# Patient Record
Sex: FEMALE | Race: WHITE | NOT HISPANIC OR LATINO | URBAN - METROPOLITAN AREA
[De-identification: names, ages, dates, MRNs, and addresses within clinical notes are randomized per-mention and may not be internally consistent; named-entity substitution may affect disease eponyms.]

---

## 2017-10-24 ENCOUNTER — EMERGENCY (EMERGENCY)
Facility: HOSPITAL | Age: 42
LOS: 0 days | Discharge: HOME | End: 2017-10-24

## 2017-10-24 DIAGNOSIS — R07.89 OTHER CHEST PAIN: ICD-10-CM

## 2017-10-24 DIAGNOSIS — R42 DIZZINESS AND GIDDINESS: ICD-10-CM

## 2017-10-24 DIAGNOSIS — E03.9 HYPOTHYROIDISM, UNSPECIFIED: ICD-10-CM

## 2017-10-24 DIAGNOSIS — Z79.899 OTHER LONG TERM (CURRENT) DRUG THERAPY: ICD-10-CM

## 2021-08-09 ENCOUNTER — NON-APPOINTMENT (OUTPATIENT)
Age: 46
End: 2021-08-09

## 2021-08-10 ENCOUNTER — APPOINTMENT (OUTPATIENT)
Dept: RHEUMATOLOGY | Facility: CLINIC | Age: 46
End: 2021-08-10
Payer: COMMERCIAL

## 2021-08-10 ENCOUNTER — TRANSCRIPTION ENCOUNTER (OUTPATIENT)
Age: 46
End: 2021-08-10

## 2021-08-10 VITALS
HEART RATE: 63 BPM | BODY MASS INDEX: 22.16 KG/M2 | TEMPERATURE: 98.2 F | HEIGHT: 65 IN | SYSTOLIC BLOOD PRESSURE: 122 MMHG | OXYGEN SATURATION: 98 % | WEIGHT: 133 LBS | DIASTOLIC BLOOD PRESSURE: 82 MMHG

## 2021-08-10 DIAGNOSIS — Z86.39 PERSONAL HISTORY OF OTHER ENDOCRINE, NUTRITIONAL AND METABOLIC DISEASE: ICD-10-CM

## 2021-08-10 DIAGNOSIS — Z87.09 PERSONAL HISTORY OF OTHER DISEASES OF THE RESPIRATORY SYSTEM: ICD-10-CM

## 2021-08-10 DIAGNOSIS — Z86.79 PERSONAL HISTORY OF OTHER DISEASES OF THE CIRCULATORY SYSTEM: ICD-10-CM

## 2021-08-10 DIAGNOSIS — Z78.9 OTHER SPECIFIED HEALTH STATUS: ICD-10-CM

## 2021-08-10 DIAGNOSIS — Z83.3 FAMILY HISTORY OF DIABETES MELLITUS: ICD-10-CM

## 2021-08-10 PROCEDURE — 99205 OFFICE O/P NEW HI 60 MIN: CPT

## 2021-08-10 NOTE — ASSESSMENT
[FreeTextEntry1] : 46 y F here for raynauds. 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 r/b/a discused w pt\par nitro paste for finger base use\par advised core warming  techniques\par \par \par Asthma\par c/w following up w pulmonologist\par I have asked her to bring PFTs results to us\par \par vit D insufficiency\par c/w 1000 u daily\par recheck level\par \par COVID vaccine advised \par \par fu 4 wk\par \par Total time spent was   . Time was divided in review of labs and imaging studies, obtaining history, personally examining patient, counselling patient/ family, ordering medications/tests/ imaging tests, communicating with other health care providers, documentation in electronic health records, independent interpretation of labs, imaging results and procedure results and communicating to the patient/family and care co ordination.\par \par

## 2021-08-10 NOTE — HISTORY OF PRESENT ILLNESS
[FreeTextEntry1] : 46 y here for raynauds. \par \par For many years, she has had discoloration of fingertips and toes that worsened with cold. She has had 3 phase color changes and extreme pain and pins and needles. \par \par SHe feels that her fingers are tight. Her  has weakened. she denied swallowing issues. she has had both shoulder pain that radiates down. \par \par She denied any rashes, canker sores. she has had hair loss and chest pain. chest pain lasts for 2 seconds. she reports that when she wakes yp her hands are swollen.  She denied redness of eyes or painful eye movements.

## 2021-08-11 ENCOUNTER — LABORATORY RESULT (OUTPATIENT)
Age: 46
End: 2021-08-11

## 2021-08-25 ENCOUNTER — APPOINTMENT (OUTPATIENT)
Dept: RHEUMATOLOGY | Facility: CLINIC | Age: 46
End: 2021-08-25
Payer: COMMERCIAL

## 2021-08-25 LAB
25(OH)D3 SERPL-MCNC: 30 NG/ML
ALBUMIN SERPL ELPH-MCNC: 4.7 G/DL
ALDOLASE SERPL-CCNC: 4.1 U/L
ALP BLD-CCNC: 45 U/L
ALT SERPL-CCNC: 14 U/L
ANA PAT FLD IF-IMP: ABNORMAL
ANA PATTERN: ABNORMAL
ANA SER IF-ACNC: ABNORMAL
ANA TITER: ABNORMAL
ANION GAP SERPL CALC-SCNC: 13 MMOL/L
AST SERPL-CCNC: 23 U/L
B2 GLYCOPROT1 IGA SERPL IA-ACNC: <5 SAU
B2 GLYCOPROT1 IGG SER-ACNC: <5 SGU
B2 GLYCOPROT1 IGM SER-ACNC: <5 SMU
BASOPHILS # BLD AUTO: 0.03 K/UL
BASOPHILS NFR BLD AUTO: 0.7 %
BILIRUB SERPL-MCNC: 0.4 MG/DL
BUN SERPL-MCNC: 11 MG/DL
C3 SERPL-MCNC: 89 MG/DL
C4 SERPL-MCNC: 18 MG/DL
CALCIUM SERPL-MCNC: 9.1 MG/DL
CARDIOLIPIN AB SER IA-ACNC: POSITIVE
CCP AB SER IA-ACNC: <8 UNITS
CHLORIDE SERPL-SCNC: 101 MMOL/L
CK SERPL-CCNC: 177 U/L
CO2 SERPL-SCNC: 23 MMOL/L
CREAT SERPL-MCNC: 0.7 MG/DL
CREAT SPEC-SCNC: 84 MG/DL
CREAT/PROT UR: 0.1 RATIO
CRP SERPL-MCNC: <3 MG/L
ENA RNP AB SER IA-ACNC: 0.3 AL
ENA SCL70 IGG SER IA-ACNC: <0.2 AL
ENA SM AB SER IA-ACNC: <0.2 AL
ENA SS-A AB SER IA-ACNC: <0.2 AL
ENA SS-B AB SER IA-ACNC: <0.2 AL
EOSINOPHIL # BLD AUTO: 0.08 K/UL
EOSINOPHIL NFR BLD AUTO: 1.8 %
ERYTHROCYTE [SEDIMENTATION RATE] IN BLOOD BY WESTERGREN METHOD: 6 MM/HR
GLUCOSE SERPL-MCNC: 74 MG/DL
HCT VFR BLD CALC: 34 %
HGB BLD-MCNC: 11.4 G/DL
IMM GRANULOCYTES NFR BLD AUTO: 0.5 %
LYMPHOCYTES # BLD AUTO: 1.61 K/UL
LYMPHOCYTES NFR BLD AUTO: 36.5 %
MAN DIFF?: NORMAL
MCHC RBC-ENTMCNC: 33.2 PG
MCHC RBC-ENTMCNC: 33.5 G/DL
MCV RBC AUTO: 99.1 FL
MONOCYTES # BLD AUTO: 0.31 K/UL
MONOCYTES NFR BLD AUTO: 7 %
NEUTROPHILS # BLD AUTO: 2.36 K/UL
NEUTROPHILS NFR BLD AUTO: 53.5 %
PLATELET # BLD AUTO: 273 K/UL
POTASSIUM SERPL-SCNC: 3.6 MMOL/L
PROT SERPL-MCNC: 7.7 G/DL
PROT UR-MCNC: 6 MG/DLG/24H
RBC # BLD: 3.43 M/UL
RBC # FLD: 13.1 %
RF+CCP IGG SER-IMP: NEGATIVE
RHEUMATOID FACT SER QL: <10 IU/ML
SODIUM SERPL-SCNC: 137 MMOL/L
STREP DNASE B TITR SER: 79 U/ML
WBC # FLD AUTO: 4.41 K/UL

## 2021-08-25 PROCEDURE — 99215 OFFICE O/P EST HI 40 MIN: CPT | Mod: 95

## 2021-08-25 NOTE — ASSESSMENT
[FreeTextEntry1] : 46 y F here for raynauds. 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 \par nitro paste for finger base use\par advised core warming  techniques\par \par  Scleroderma, myositis, RA workup, neg\par Concern for SLE\par leukopenia, anemia, ARSENIO positive 1:80 speckled , aCL IgM high at 30.5. oral ulcers and pleurisy\par advised she needs repeat aPL testing next month. Literature does not support aspirin use in this case\par haptoglobin, LDH, Marti, retic count to workup anemia\par PFT pending for pleurisy workup\par Plaquenil 400 mg daily advised. r/b/a discussed. she will consider it next visit\par \par \par \par Asthma\par c/w following up w pulmonologist\par I have asked her to bring PFTs results to us\par \par vit D insufficiency\par c/w 1000 u daily\par Level was normal \par \par HM\par COVID vaccine advised \par \par fu 4 wk\par \par Total time spent was   . Time was divided in review of labs and imaging studies, obtaining history, personally examining patient, counselling patient/ family, ordering medications/tests/ imaging tests, communicating with other health care providers, documentation in electronic health records, independent interpretation of labs, imaging results and procedure results and communicating to the patient/family and care co ordination.\par \par

## 2021-08-31 ENCOUNTER — NON-APPOINTMENT (OUTPATIENT)
Age: 46
End: 2021-08-31

## 2021-08-31 DIAGNOSIS — M79.89 OTHER SPECIFIED SOFT TISSUE DISORDERS: ICD-10-CM

## 2021-09-16 ENCOUNTER — APPOINTMENT (OUTPATIENT)
Dept: RHEUMATOLOGY | Facility: CLINIC | Age: 46
End: 2021-09-16
Payer: COMMERCIAL

## 2021-09-16 LAB
DIRECT COOMBS: NORMAL
EJ AB SER QL: NEGATIVE
ENA JO1 AB SER IA-ACNC: <20 UNITS
ENA PM/SCL AB SER-ACNC: <20 UNITS
ENA SM+RNP AB SER IA-ACNC: <20 UNITS
ENA SS-A IGG SER QL: <20 UNITS
FIBRILLARIN AB SER QL: NEGATIVE
HAPTOGLOB SERPL-MCNC: 101 MG/DL
KU AB SER QL: NEGATIVE
LDH SERPL-CCNC: 151
MDA-5 (P140)(CADM-140): <20 UNITS
MI2 AB SER QL: NEGATIVE
NXP-2 (P140): <20 UNITS
OJ AB SER QL: NEGATIVE
PL12 AB SER QL: NEGATIVE
PL7 AB SER QL: NEGATIVE
RBC # BLD: 3.64 M/UL
RETICS # AUTO: 1.2 %
RETICS AGGREG/RBC NFR: 41.9 K/UL
SRP AB SERPL QL: NEGATIVE
TIF GAMMA (P155/140): <20 UNITS
U2 SNRNP AB SER QL: NEGATIVE

## 2021-09-16 PROCEDURE — 99215 OFFICE O/P EST HI 40 MIN: CPT | Mod: 95

## 2021-09-16 NOTE — HISTORY OF PRESENT ILLNESS
[FreeTextEntry1] : 46 y here for raynauds. \par \par Interval history\par She still has fingertips and toes discoloration red to blue to white. It is associated with pain. \par She still has finger swelling every day, worse in AM. Her feet are swollen as well. \par No rashes. \par SHe has had chest pain with breathing for the past 2 months. \par Today she reports that she has had oral sores on inside of lips infrequently. \par She has had hair loss from front. \par She has had no miscarriage or blood clot history. reported 3 pregnancies were normal. \par \par Rheum history \par For many years, she has had discoloration of fingertips and toes that worsened with cold. She has had 3 phase color changes and extreme pain and pins and needles. \par \par SHe feels that her fingers are tight. Her  has weakened. she denied swallowing issues. she has had both shoulder pain that radiates down. \par \par She denied any rashes, canker sores. she has had hair loss and chest pain. chest pain lasts for 2 seconds. she reports that when she wakes yp her hands are swollen.  She denied redness of eyes or painful eye movements.

## 2021-09-16 NOTE — ASSESSMENT
[FreeTextEntry1] : 46 y F here for raynauds. 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 \par nitro paste for finger base use\par advised core warming  techniques\par \par  Scleroderma, myositis, RA workup, neg\par Concern for SLE\par leukopenia, anemia, ARSENIO positive 1:80 speckled , aCL IgM high at 30.5. oral ulcers and pleurisy\par advised she needs repeat aPL testing in 11/21. Literature does not support aspirin use in this case\par haptoglobin, LDH, Marti, retic count all negative so no hemolytic anemia \par PFT pending for pleurisy workup\par Plaquenil 400 mg daily advised. r/b/a discussed.  ophthalmology yearly visit advised\par \par \par \par Asthma\par c/w following up w pulmonologist\par I have asked her to bring PFTs results to us\par \par vit D insufficiency\par c/w 1000 u daily\par Level was normal \par \par HM\par COVID vaccine advised \par \par fu 4 wk\par \par Total time spent was   . Time was divided in review of labs and imaging studies, obtaining history, personally examining patient, counselling patient/ family, ordering medications/tests/ imaging tests, communicating with other health care providers, documentation in electronic health records, independent interpretation of labs, imaging results and procedure results and communicating to the patient/family and care co ordination.\par \par

## 2021-09-16 NOTE — PHYSICAL EXAM
[General Appearance - Alert] : alert [General Appearance - In No Acute Distress] : in no acute distress [Sclera] : the sclera and conjunctiva were normal [PERRL With Normal Accommodation] : pupils were equal in size, round, and reactive to light [Extraocular Movements] : extraocular movements were intact [Outer Ear] : the ears and nose were normal in appearance [Oropharynx] : the oropharynx was normal [Neck Appearance] : the appearance of the neck was normal [Neck Cervical Mass (___cm)] : no neck mass was observed [Jugular Venous Distention Increased] : there was no jugular-venous distention [Thyroid Diffuse Enlargement] : the thyroid was not enlarged [Thyroid Nodule] : there were no palpable thyroid nodules [Auscultation Breath Sounds / Voice Sounds] : lungs were clear to auscultation bilaterally [Heart Rate And Rhythm] : heart rate was normal and rhythm regular [Heart Sounds] : normal S1 and S2 [Heart Sounds Gallop] : no gallops [Murmurs] : no murmurs [Heart Sounds Pericardial Friction Rub] : no pericardial rub [Bowel Sounds] : normal bowel sounds [Abdomen Soft] : soft [Abdomen Tenderness] : non-tender [Abdomen Mass (___ Cm)] : no abdominal mass palpated [Abnormal Walk] : normal gait [Musculoskeletal - Swelling] : no joint swelling seen [Nail Clubbing] : no clubbing  or cyanosis of the fingernails [Motor Tone] : muscle strength and tone were normal [Skin Color & Pigmentation] : normal skin color and pigmentation [Skin Turgor] : normal skin turgor [] : no rash [Deep Tendon Reflexes (DTR)] : deep tendon reflexes were 2+ and symmetric [Sensation] : the sensory exam was normal to light touch and pinprick [No Focal Deficits] : no focal deficits [Oriented To Time, Place, And Person] : oriented to person, place, and time [Impaired Insight] : insight and judgment were intact [Affect] : the affect was normal

## 2021-09-29 ENCOUNTER — OUTPATIENT (OUTPATIENT)
Dept: OUTPATIENT SERVICES | Facility: HOSPITAL | Age: 46
LOS: 1 days | Discharge: HOME | End: 2021-09-29
Payer: COMMERCIAL

## 2021-09-29 DIAGNOSIS — M79.89 OTHER SPECIFIED SOFT TISSUE DISORDERS: ICD-10-CM

## 2021-09-29 DIAGNOSIS — I73.00 RAYNAUD'S SYNDROME WITHOUT GANGRENE: ICD-10-CM

## 2021-09-29 PROCEDURE — 93970 EXTREMITY STUDY: CPT | Mod: 26

## 2021-10-07 ENCOUNTER — APPOINTMENT (OUTPATIENT)
Dept: RHEUMATOLOGY | Facility: CLINIC | Age: 46
End: 2021-10-07
Payer: COMMERCIAL

## 2021-10-07 VITALS
OXYGEN SATURATION: 98 % | BODY MASS INDEX: 21.49 KG/M2 | SYSTOLIC BLOOD PRESSURE: 108 MMHG | DIASTOLIC BLOOD PRESSURE: 72 MMHG | HEART RATE: 60 BPM | WEIGHT: 129 LBS | TEMPERATURE: 98.1 F | HEIGHT: 65 IN

## 2021-10-07 PROCEDURE — 99215 OFFICE O/P EST HI 40 MIN: CPT

## 2021-10-07 NOTE — PHYSICAL EXAM
[General Appearance - In No Acute Distress] : in no acute distress [General Appearance - Alert] : alert [Sclera] : the sclera and conjunctiva were normal [PERRL With Normal Accommodation] : pupils were equal in size, round, and reactive to light [Extraocular Movements] : extraocular movements were intact [Outer Ear] : the ears and nose were normal in appearance [Oropharynx] : the oropharynx was normal [Neck Appearance] : the appearance of the neck was normal [Neck Cervical Mass (___cm)] : no neck mass was observed [Jugular Venous Distention Increased] : there was no jugular-venous distention [Thyroid Diffuse Enlargement] : the thyroid was not enlarged [Thyroid Nodule] : there were no palpable thyroid nodules [Auscultation Breath Sounds / Voice Sounds] : lungs were clear to auscultation bilaterally [Heart Rate And Rhythm] : heart rate was normal and rhythm regular [Heart Sounds] : normal S1 and S2 [Heart Sounds Gallop] : no gallops [Murmurs] : no murmurs [Heart Sounds Pericardial Friction Rub] : no pericardial rub [Bowel Sounds] : normal bowel sounds [Abdomen Soft] : soft [Abdomen Tenderness] : non-tender [Abdomen Mass (___ Cm)] : no abdominal mass palpated [Abnormal Walk] : normal gait [Nail Clubbing] : no clubbing  or cyanosis of the fingernails [Musculoskeletal - Swelling] : no joint swelling seen [Motor Tone] : muscle strength and tone were normal [Skin Color & Pigmentation] : normal skin color and pigmentation [Skin Turgor] : normal skin turgor [] : no rash [Deep Tendon Reflexes (DTR)] : deep tendon reflexes were 2+ and symmetric [Sensation] : the sensory exam was normal to light touch and pinprick [No Focal Deficits] : no focal deficits [Oriented To Time, Place, And Person] : oriented to person, place, and time [Impaired Insight] : insight and judgment were intact [Affect] : the affect was normal

## 2021-10-07 NOTE — ASSESSMENT
[FreeTextEntry1] : 46 y F here for raynauds. 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 r/b/a discussed. still with frequent episodes of raynaud. \par start sildenafil 20 mg BID (space out nifeipine and sildenafil). r/b/a discussed\par nitro paste for finger base use discontinued as contraindicated with sildenafil 2/2 hypotension\par advised core warming  techniques\par US leg negative for DVT\par \par  Scleroderma, myositis, RA workup, neg\par Concern for SLE\par leukopenia, anemia, ARSENIO positive 1:80 speckled , aCL IgM high at 30.5. oral ulcers and pleurisy, temporal alopecia\par advised she needs repeat aPL testing in 11/21. Literature does not support aspirin use in this case\par haptoglobin, LDH, Marti, retic count all negative so no hemolytic anemia \par PFT pending for pleurisy workup\par Plaquenil 400 mg daily advised. r/b/a discussed.  ophthalmology yearly visit advised\par \par \par \par Asthma\par c/w following up w pulmonologist\par I have asked her to bring PFTs results to us\par \par vit D insufficiency\par c/w 1000 u daily\par Level was normal \par \par HM\par COVID vaccine advised \par \par fu 4 wk\par \par Total time spent was   . Time was divided in review of labs and imaging studies, obtaining history, personally examining patient, counselling patient/ family, ordering medications/tests/ imaging tests, communicating with other health care providers, documentation in electronic health records, independent interpretation of labs, imaging results and procedure results and communicating to the patient/family and care co ordination.\par \par

## 2021-12-08 ENCOUNTER — APPOINTMENT (OUTPATIENT)
Dept: RHEUMATOLOGY | Facility: CLINIC | Age: 46
End: 2021-12-08
Payer: COMMERCIAL

## 2021-12-08 PROCEDURE — 99215 OFFICE O/P EST HI 40 MIN: CPT

## 2021-12-08 NOTE — ASSESSMENT
[FreeTextEntry1] : 46 y F here for raynauds. 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 r/b/a discussed. still with frequent episodes of raynaud. \par start sildenafil 20 mg BID (space out nifeipine and sildenafil). r/b/a discussed. THis medication was denied  by insurance\par nitro paste for finger base use discontinued as contraindicated with sildenafil 2/2 hypotension\par advised core warming  techniques\par US leg negative for DVT\par \par  Scleroderma, myositis, RA workup, neg\par Concern for SLE\par leukopenia, anemia, ARSENIO positive 1:80 speckled , aCL IgM high at 30.5. oral ulcers and pleurisy, temporal alopecia\par advised she needs repeat aPL testing in 12/21. ordsered labs. Literature does not support aspirin use in this case\par haptoglobin, LDH, Marti, retic count all negative so no hemolytic anemia \par PFT pending for pleurisy workup\par Plaquenil 400 mg daily advised. r/b/a discussed.  ophthalmology yearly visit advised\par If she has continued synovitis on Plaquenil, will consider adding MTX\par \par \par Asthma\par c/w following up w pulmonologist\par I have asked her to bring PFTs results to us\par \par vit D insufficiency\par c/w 1000 u daily\par Level was normal \par \par HM\par COVID vaccine advised \par \par fu 4 wk\par \par Total time spent was   . Time was divided in review of labs and imaging studies, obtaining history, personally examining patient, counselling patient/ family, ordering medications/tests/ imaging tests, communicating with other health care providers, documentation in electronic health records, independent interpretation of labs, imaging results and procedure results and communicating to the patient/family and care co ordination.\par \par

## 2021-12-10 ENCOUNTER — LABORATORY RESULT (OUTPATIENT)
Age: 46
End: 2021-12-10

## 2021-12-16 LAB
B2 GLYCOPROT1 IGA SERPL IA-ACNC: <5 SAU
B2 GLYCOPROT1 IGG SER-ACNC: <5 SGU
B2 GLYCOPROT1 IGM SER-ACNC: <5 SMU
CARDIOLIPIN AB SER IA-ACNC: POSITIVE

## 2022-01-03 ENCOUNTER — APPOINTMENT (OUTPATIENT)
Dept: RHEUMATOLOGY | Facility: CLINIC | Age: 47
End: 2022-01-03

## 2022-01-24 ENCOUNTER — APPOINTMENT (OUTPATIENT)
Dept: RHEUMATOLOGY | Facility: CLINIC | Age: 47
End: 2022-01-24

## 2022-03-21 ENCOUNTER — APPOINTMENT (OUTPATIENT)
Dept: RHEUMATOLOGY | Facility: CLINIC | Age: 47
End: 2022-03-21
Payer: COMMERCIAL

## 2022-03-21 VITALS
HEIGHT: 65 IN | BODY MASS INDEX: 21.66 KG/M2 | TEMPERATURE: 97.9 F | HEART RATE: 95 BPM | DIASTOLIC BLOOD PRESSURE: 80 MMHG | OXYGEN SATURATION: 97 % | WEIGHT: 130 LBS | SYSTOLIC BLOOD PRESSURE: 120 MMHG

## 2022-03-21 DIAGNOSIS — R20.0 ANESTHESIA OF SKIN: ICD-10-CM

## 2022-03-21 PROCEDURE — 99215 OFFICE O/P EST HI 40 MIN: CPT

## 2022-03-21 RX ORDER — TADALAFIL 20 MG/1
20 TABLET ORAL DAILY
Qty: 30 | Refills: 3 | Status: DISCONTINUED | COMMUNITY
Start: 2021-12-13 | End: 2022-03-21

## 2022-03-21 NOTE — PHYSICAL EXAM
[General Appearance - Alert] : alert [General Appearance - In No Acute Distress] : in no acute distress [PERRL With Normal Accommodation] : pupils were equal in size, round, and reactive to light [Sclera] : the sclera and conjunctiva were normal [Extraocular Movements] : extraocular movements were intact [Outer Ear] : the ears and nose were normal in appearance [Oropharynx] : the oropharynx was normal [Neck Appearance] : the appearance of the neck was normal [Neck Cervical Mass (___cm)] : no neck mass was observed [Jugular Venous Distention Increased] : there was no jugular-venous distention [Thyroid Diffuse Enlargement] : the thyroid was not enlarged [Thyroid Nodule] : there were no palpable thyroid nodules [Auscultation Breath Sounds / Voice Sounds] : lungs were clear to auscultation bilaterally [Heart Rate And Rhythm] : heart rate was normal and rhythm regular [Heart Sounds] : normal S1 and S2 [Heart Sounds Gallop] : no gallops [Murmurs] : no murmurs [Heart Sounds Pericardial Friction Rub] : no pericardial rub [Bowel Sounds] : normal bowel sounds [Abdomen Soft] : soft [Abdomen Tenderness] : non-tender [Abdomen Mass (___ Cm)] : no abdominal mass palpated [Abnormal Walk] : normal gait [Nail Clubbing] : no clubbing  or cyanosis of the fingernails [Musculoskeletal - Swelling] : no joint swelling seen [Motor Tone] : muscle strength and tone were normal [Skin Color & Pigmentation] : normal skin color and pigmentation [Skin Turgor] : normal skin turgor [] : no rash [Deep Tendon Reflexes (DTR)] : deep tendon reflexes were 2+ and symmetric [Sensation] : the sensory exam was normal to light touch and pinprick [No Focal Deficits] : no focal deficits [Oriented To Time, Place, And Person] : oriented to person, place, and time [Impaired Insight] : insight and judgment were intact [Affect] : the affect was normal

## 2022-03-22 NOTE — HISTORY OF PRESENT ILLNESS
[FreeTextEntry1] : 46 y here for raynauds and SLE \par \par \par 3/21/22:\par She has hair loss, memory loss for the past 2 months. Distant memory is intact. She has right wrist pain and right forearm bone pain. Fingers and feet feel swollen. Raynaud's is frequent she has gloves and hand warmers associated with numbness with pins and needles. Feels sweats, fatigue, she gets out of breath going up stairs, difficulty taking a deep breath (since August 2021), and palpitations. She gets red rash over nose, photosensitivity, mouth sores, numbness in lips, pruritus, headache. She feels depressed.\par \par 12/8/22:\par She still has fingertips and toes discoloration red to blue to white. It is associated with pain. \par She still has finger swelling every day, worse in AM. Her feet are swollen as well. \par No rashes. \par SHe has had chest pain with breathing for the past 2 months. \par Today she reports that she has had oral sores on inside of lips infrequently. \par She has had hair loss from front. \par She has had no miscarriage or blood clot history. reported 3 pregnancies were normal. \par \par Rheum history \par For many years, she has had discoloration of fingertips and toes that worsened with cold. She has had 3 phase color changes and extreme pain and pins and needles. \par \par SHe feels that her fingers are tight. Her  has weakened. she denied swallowing issues. she has had both shoulder pain that radiates down. \par \par She denied any rashes, canker sores. she has had hair loss and chest pain. chest pain lasts for 2 seconds. she reports that when she wakes yp her hands are swollen.  She denied redness of eyes or painful eye movements.

## 2022-03-22 NOTE — ASSESSMENT
[FreeTextEntry1] : 46 y F here for raynauds. 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 r/b/a discussed. still with frequent episodes of raynaud. \par Continue sildenafil 20 mg BID (space out nifeipine and sildenafil). r/b/a discussed. THis medication was denied  by insurance\par nitro paste for finger base use discontinued as contraindicated with sildenafil 2/2 hypotension\par advised core warming  techniques\par US leg negative for DVT\par \par  Scleroderma, myositis, RA workup, neg\par Concern for SLE\par leukopenia, anemia, ARSENIO positive 1:80 speckled , aCL IgM high at 30.5. oral ulcers and pleurisy, temporal alopecia\par advised she needs repeat aPL testing in 12/21. ordered labs. Literature does not support aspirin use in this case\par haptoglobin, LDH, Marti, retic count all negative so no hemolytic anemia \par PFT pending for pleurisy workup\par Plaquenil 400 mg daily advised. r/b/a discussed.  ophthalmology yearly visit advised\par She has continued joint pains start MTX 6 tabs weekly and folic acid 1 mg daily r/b/a discussed needs monitoring CBC, CMP, ESR, CRP in 1 month\par Check SLE monitoring labs\par \par \par Asthma\par c/w following up w pulmonologist \par I have asked her to bring PFTs results to us\par \par Palpitations with dyspnea\par -Refer back to cardiology\par \par Forgetfulness and paresthesias\par -memory difficulties ?fibromyalgia picture \par -paresthesias ? raynauds\par -See neurology for eval\par \par \par vit D insufficiency\par c/w 1000 u daily\par Level was normal \par \par HM\par COVID vaccine advised \par \par fu 4 wk\par \par Total time spent was   . Time was divided in review of labs and imaging studies, obtaining history, personally examining patient, counselling patient/ family, ordering medications/tests/ imaging tests, communicating with other health care providers, documentation in electronic health records, independent interpretation of labs, imaging results and procedure results and communicating to the patient/family and care co ordination.\par \par

## 2022-03-26 ENCOUNTER — LABORATORY RESULT (OUTPATIENT)
Age: 47
End: 2022-03-26

## 2022-03-28 LAB
ALBUMIN SERPL ELPH-MCNC: 4.5 G/DL
ALP BLD-CCNC: 37 U/L
ALT SERPL-CCNC: 11 U/L
ANION GAP SERPL CALC-SCNC: 12 MMOL/L
APPEARANCE: ABNORMAL
AST SERPL-CCNC: 17 U/L
BASOPHILS # BLD AUTO: 0.03 K/UL
BASOPHILS NFR BLD AUTO: 0.9 %
BILIRUB SERPL-MCNC: 0.7 MG/DL
BILIRUBIN URINE: NEGATIVE
BLOOD URINE: ABNORMAL
BUN SERPL-MCNC: 14 MG/DL
C3 SERPL-MCNC: 168 MG/DL
C4 SERPL-MCNC: 17 MG/DL
CALCIUM SERPL-MCNC: 9.3 MG/DL
CHLORIDE SERPL-SCNC: 103 MMOL/L
CO2 SERPL-SCNC: 24 MMOL/L
COLOR: YELLOW
CREAT SERPL-MCNC: 0.7 MG/DL
CREAT SPEC-SCNC: 170 MG/DL
CREAT/PROT UR: 0.1 RATIO
CRP SERPL-MCNC: <3 MG/L
EGFR: 108 ML/MIN/1.73M2
EOSINOPHIL # BLD AUTO: 0.07 K/UL
EOSINOPHIL NFR BLD AUTO: 2 %
ERYTHROCYTE [SEDIMENTATION RATE] IN BLOOD BY WESTERGREN METHOD: 7 MM/HR
GLUCOSE QUALITATIVE U: NEGATIVE
GLUCOSE SERPL-MCNC: 85 MG/DL
HCT VFR BLD CALC: 37.5 %
HGB BLD-MCNC: 12.2 G/DL
IMM GRANULOCYTES NFR BLD AUTO: 0.3 %
KETONES URINE: NEGATIVE
LEUKOCYTE ESTERASE URINE: ABNORMAL
LYMPHOCYTES # BLD AUTO: 1.4 K/UL
LYMPHOCYTES NFR BLD AUTO: 40.3 %
MAN DIFF?: NORMAL
MCHC RBC-ENTMCNC: 32.5 G/DL
MCHC RBC-ENTMCNC: 33 PG
MCV RBC AUTO: 101.4 FL
MONOCYTES # BLD AUTO: 0.22 K/UL
MONOCYTES NFR BLD AUTO: 6.3 %
NEUTROPHILS # BLD AUTO: 1.74 K/UL
NEUTROPHILS NFR BLD AUTO: 50.2 %
NITRITE URINE: NEGATIVE
PH URINE: 5.5
PLATELET # BLD AUTO: 293 K/UL
POTASSIUM SERPL-SCNC: 4.6 MMOL/L
PROT SERPL-MCNC: 7.2 G/DL
PROT UR-MCNC: 10 MG/DLG/24H
PROTEIN URINE: NORMAL
RBC # BLD: 3.7 M/UL
RBC # FLD: 13.4 %
SODIUM SERPL-SCNC: 139 MMOL/L
SPECIFIC GRAVITY URINE: 1.02
UROBILINOGEN URINE: NORMAL
WBC # FLD AUTO: 3.47 K/UL

## 2022-03-30 LAB — DSDNA AB SER-ACNC: 13 IU/ML

## 2022-04-07 ENCOUNTER — APPOINTMENT (OUTPATIENT)
Dept: CARDIOLOGY | Facility: CLINIC | Age: 47
End: 2022-04-07
Payer: COMMERCIAL

## 2022-04-07 VITALS — SYSTOLIC BLOOD PRESSURE: 130 MMHG | DIASTOLIC BLOOD PRESSURE: 80 MMHG | RESPIRATION RATE: 18 BRPM | HEART RATE: 76 BPM

## 2022-04-07 VITALS — HEIGHT: 65 IN | BODY MASS INDEX: 21.66 KG/M2 | WEIGHT: 130 LBS

## 2022-04-07 DIAGNOSIS — E78.2 MIXED HYPERLIPIDEMIA: ICD-10-CM

## 2022-04-07 DIAGNOSIS — Z82.41 FAMILY HISTORY OF SUDDEN CARDIAC DEATH: ICD-10-CM

## 2022-04-07 DIAGNOSIS — Z78.9 OTHER SPECIFIED HEALTH STATUS: ICD-10-CM

## 2022-04-07 DIAGNOSIS — E03.9 HYPOTHYROIDISM, UNSPECIFIED: ICD-10-CM

## 2022-04-07 PROCEDURE — 93000 ELECTROCARDIOGRAM COMPLETE: CPT

## 2022-04-07 PROCEDURE — 99204 OFFICE O/P NEW MOD 45 MIN: CPT

## 2022-04-07 RX ORDER — LEVOTHYROXINE SODIUM 0.15 MG/1
150 TABLET ORAL
Refills: 0 | Status: ACTIVE | COMMUNITY

## 2022-04-07 NOTE — PHYSICAL EXAM
[Well Developed] : well developed [Well Nourished] : well nourished [No Acute Distress] : no acute distress [Normal Conjunctiva] : normal conjunctiva [Normal Venous Pressure] : normal venous pressure [No Carotid Bruit] : no carotid bruit [Normal S1, S2] : normal S1, S2 [No Rub] : no rub [No Gallop] : no gallop [Clear Lung Fields] : clear lung fields [Good Air Entry] : good air entry [No Respiratory Distress] : no respiratory distress  [Soft] : abdomen soft [Non Tender] : non-tender [No Masses/organomegaly] : no masses/organomegaly [Normal Bowel Sounds] : normal bowel sounds [Normal Gait] : normal gait [No Edema] : no edema [No Cyanosis] : no cyanosis [No Clubbing] : no clubbing [No Varicosities] : no varicosities [No Rash] : no rash [No Skin Lesions] : no skin lesions [Moves all extremities] : moves all extremities [No Focal Deficits] : no focal deficits [Normal Speech] : normal speech [Alert and Oriented] : alert and oriented [Normal memory] : normal memory [de-identified] : II/VI systolic

## 2022-04-18 ENCOUNTER — APPOINTMENT (OUTPATIENT)
Dept: RHEUMATOLOGY | Facility: CLINIC | Age: 47
End: 2022-04-18
Payer: COMMERCIAL

## 2022-04-18 DIAGNOSIS — Z79.899 OTHER LONG TERM (CURRENT) DRUG THERAPY: ICD-10-CM

## 2022-04-18 PROCEDURE — 99214 OFFICE O/P EST MOD 30 MIN: CPT

## 2022-04-18 NOTE — ASSESSMENT
[FreeTextEntry1] : 46 y F here for raynauds. \par \par Raynauds and SLE\par Likely  secondary to autoimmune disease\par It is getting worse now\par has capillary dropout on nailfold\par no skin thickening, sclerodactyly, telangiectasias \par no esophageal symptoms \par Increase Nifedipine to 30 QID. r/b/a discussed. still with frequent episodes of raynaud. \par Continue sildenafil 20 mg BID (space out nifeipine and sildenafil). r/b/a discussed. If symptoms persist with escalation of Nifedipine increase sildenafil to TID\par nitro paste for finger base use discontinued as contraindicated with sildenafil 2/2 hypotension\par advised core warming  techniques\par US leg negative for DVT\par \par \par SLE\par Scleroderma, myositis, RA workup, neg\par leukopenia, anemia, ARSENIO positive 1:80 speckled , aCL IgM high at 30.5. oral ulcers and pleurisy, temporal alopecia\par advised she needs repeat aPL testing in 12/21. ordered labs. Literature does not support aspirin use in this case\par haptoglobin, LDH, Marti, retic count all negative so no hemolytic anemia \par PFT pending for pleurisy workup\par Plaquenil 400 mg daily advised. r/b/a discussed.  ophthalmology yearly visit advised\par She has continued joint pains start MTX 6 tabs weekly and folic acid 1 mg daily r/b/a discussed needs monitoring Check MTX monitoring labs CBC, CMP, ESR, CRP\par \par \par \par Asthma\par c/w following up w pulmonologist \par I have asked her to bring PFTs results to us\par \par Palpitations with dyspnea\par -Following with cardiology TTE and stress test pending\par \par Forgetfulness and paresthesias\par -memory difficulties ?fibromyalgia picture \par -paresthesias ? raynauds\par -Following with neurology \par \par \par vit D insufficiency\par c/w 1000 u daily\par Level was normal \par \par HM\par COVID vaccine advised \par \par fu 6 wk\par \par

## 2022-04-18 NOTE — HISTORY OF PRESENT ILLNESS
[FreeTextEntry1] : 46 year old female here for raynauds and SLE \par \par 4/18/22:\par She reports left shoulder pain for the past 2 weeks without trauma associated with weakness. It hurts when she lifts her arm and has weakness on her left upper arm. Raynauds is active in the warm weather. Still with ongoing hair loss. She has finger and wrist pains worse in the morning, AM stiffness, and swollen. Has appointment with pulmonary 5/17 Dr. Sanches. Reports dyspnea on exertion and burning sensation in her chest radiating to her back\par \par \par 3/21/22:\par She has hair loss, memory loss for the past 2 months. Distant memory is intact. She has right wrist pain and right forearm bone pain. Fingers and feet feel swollen. Raynaud's is frequent she has gloves and hand warmers associated with numbness with pins and needles. Feels sweats, fatigue, she gets out of breath going up stairs, difficulty taking a deep breath (since August 2021), and palpitations. She gets red rash over nose, photosensitivity, mouth sores, numbness in lips, pruritus, headache. She feels depressed.\par \par 12/8/22:\par She still has fingertips and toes discoloration red to blue to white. It is associated with pain. \par She still has finger swelling every day, worse in AM. Her feet are swollen as well. \par No rashes. \par SHe has had chest pain with breathing for the past 2 months. \par Today she reports that she has had oral sores on inside of lips infrequently. \par She has had hair loss from front. \par She has had no miscarriage or blood clot history. reported 3 pregnancies were normal. \par \par Rheum history \par For many years, she has had discoloration of fingertips and toes that worsened with cold. She has had 3 phase color changes and extreme pain and pins and needles. \par \par SHe feels that her fingers are tight. Her  has weakened. she denied swallowing issues. she has had both shoulder pain that radiates down. \par \par She denied any rashes, canker sores. she has had hair loss and chest pain. chest pain lasts for 2 seconds. she reports that when she wakes yp her hands are swollen.  She denied redness of eyes or painful eye movements.

## 2022-04-18 NOTE — PHYSICAL EXAM
[General Appearance - Alert] : alert [General Appearance - In No Acute Distress] : in no acute distress [Sclera] : the sclera and conjunctiva were normal [PERRL With Normal Accommodation] : pupils were equal in size, round, and reactive to light [Extraocular Movements] : extraocular movements were intact [Outer Ear] : the ears and nose were normal in appearance [Oropharynx] : the oropharynx was normal [Neck Appearance] : the appearance of the neck was normal [Neck Cervical Mass (___cm)] : no neck mass was observed [Jugular Venous Distention Increased] : there was no jugular-venous distention [Thyroid Diffuse Enlargement] : the thyroid was not enlarged [Thyroid Nodule] : there were no palpable thyroid nodules [Heart Rate And Rhythm] : heart rate was normal and rhythm regular [Auscultation Breath Sounds / Voice Sounds] : lungs were clear to auscultation bilaterally [Heart Sounds] : normal S1 and S2 [Heart Sounds Gallop] : no gallops [Murmurs] : no murmurs [Heart Sounds Pericardial Friction Rub] : no pericardial rub [Bowel Sounds] : normal bowel sounds [Abdomen Soft] : soft [Abdomen Tenderness] : non-tender [Abdomen Mass (___ Cm)] : no abdominal mass palpated [Abnormal Walk] : normal gait [Nail Clubbing] : no clubbing  or cyanosis of the fingernails [Musculoskeletal - Swelling] : no joint swelling seen [Motor Tone] : muscle strength and tone were normal [Skin Color & Pigmentation] : normal skin color and pigmentation [Skin Turgor] : normal skin turgor [] : no rash [Deep Tendon Reflexes (DTR)] : deep tendon reflexes were 2+ and symmetric [Sensation] : the sensory exam was normal to light touch and pinprick [No Focal Deficits] : no focal deficits [Oriented To Time, Place, And Person] : oriented to person, place, and time [Impaired Insight] : insight and judgment were intact [Affect] : the affect was normal [FreeTextEntry1] : Limited abduction left shoulder. [place] and empty can test positive

## 2022-05-03 LAB
ALBUMIN SERPL ELPH-MCNC: 4.5 G/DL
ALP BLD-CCNC: 40 U/L
ALT SERPL-CCNC: 10 U/L
ANION GAP SERPL CALC-SCNC: 8 MMOL/L
APPEARANCE: CLEAR
AST SERPL-CCNC: 18 U/L
BASOPHILS # BLD AUTO: 0.03 K/UL
BASOPHILS NFR BLD AUTO: 0.8 %
BILIRUB SERPL-MCNC: 0.4 MG/DL
BILIRUBIN URINE: NEGATIVE
BLOOD URINE: NEGATIVE
BUN SERPL-MCNC: 20 MG/DL
CALCIUM SERPL-MCNC: 9.4 MG/DL
CHLORIDE SERPL-SCNC: 105 MMOL/L
CO2 SERPL-SCNC: 27 MMOL/L
COLOR: NORMAL
CREAT SERPL-MCNC: 0.5 MG/DL
CRP SERPL-MCNC: <3 MG/L
EGFR: 117 ML/MIN/1.73M2
EOSINOPHIL # BLD AUTO: 0.07 K/UL
EOSINOPHIL NFR BLD AUTO: 1.8 %
ERYTHROCYTE [SEDIMENTATION RATE] IN BLOOD BY WESTERGREN METHOD: 15 MM/HR
GLUCOSE QUALITATIVE U: NEGATIVE
GLUCOSE SERPL-MCNC: 79 MG/DL
HCT VFR BLD CALC: 37.5 %
HGB BLD-MCNC: 12.4 G/DL
IMM GRANULOCYTES NFR BLD AUTO: 0.3 %
KETONES URINE: NEGATIVE
LEUKOCYTE ESTERASE URINE: NEGATIVE
LYMPHOCYTES # BLD AUTO: 1.34 K/UL
LYMPHOCYTES NFR BLD AUTO: 33.8 %
MAN DIFF?: NORMAL
MCHC RBC-ENTMCNC: 33.1 G/DL
MCHC RBC-ENTMCNC: 33.5 PG
MCV RBC AUTO: 101.4 FL
MONOCYTES # BLD AUTO: 0.31 K/UL
MONOCYTES NFR BLD AUTO: 7.8 %
NEUTROPHILS # BLD AUTO: 2.2 K/UL
NEUTROPHILS NFR BLD AUTO: 55.5 %
NITRITE URINE: NEGATIVE
PH URINE: 7
PLATELET # BLD AUTO: 250 K/UL
POTASSIUM SERPL-SCNC: 4.3 MMOL/L
PROT SERPL-MCNC: 7 G/DL
PROTEIN URINE: NEGATIVE
RBC # BLD: 3.7 M/UL
RBC # FLD: 12.6 %
SODIUM SERPL-SCNC: 140 MMOL/L
SPECIFIC GRAVITY URINE: 1.01
UROBILINOGEN URINE: NORMAL
WBC # FLD AUTO: 3.96 K/UL

## 2022-05-05 PROBLEM — Z79.899 LONG-TERM USE OF HIGH-RISK MEDICATION: Status: ACTIVE | Noted: 2022-04-18

## 2022-05-10 ENCOUNTER — APPOINTMENT (OUTPATIENT)
Dept: CARDIOLOGY | Facility: CLINIC | Age: 47
End: 2022-05-10
Payer: COMMERCIAL

## 2022-05-10 ENCOUNTER — OUTPATIENT (OUTPATIENT)
Dept: OUTPATIENT SERVICES | Facility: HOSPITAL | Age: 47
LOS: 1 days | Discharge: HOME | End: 2022-05-10
Payer: COMMERCIAL

## 2022-05-10 DIAGNOSIS — R07.89 OTHER CHEST PAIN: ICD-10-CM

## 2022-05-10 DIAGNOSIS — M25.512 PAIN IN LEFT SHOULDER: ICD-10-CM

## 2022-05-10 PROCEDURE — 93306 TTE W/DOPPLER COMPLETE: CPT

## 2022-05-10 PROCEDURE — 73030 X-RAY EXAM OF SHOULDER: CPT | Mod: 26,LT

## 2022-05-10 PROCEDURE — 93015 CV STRESS TEST SUPVJ I&R: CPT

## 2022-05-11 PROBLEM — R07.89 BURNING IN THE CHEST: Status: ACTIVE | Noted: 2022-04-07

## 2022-06-20 LAB
ALBUMIN SERPL ELPH-MCNC: 4.6 G/DL
ALP BLD-CCNC: 40 U/L
ALT SERPL-CCNC: 12 U/L
ANION GAP SERPL CALC-SCNC: 9 MMOL/L
AST SERPL-CCNC: 18 U/L
BASOPHILS # BLD AUTO: 0.03 K/UL
BASOPHILS NFR BLD AUTO: 0.9 %
BILIRUB SERPL-MCNC: 0.5 MG/DL
BUN SERPL-MCNC: 19 MG/DL
CALCIUM SERPL-MCNC: 9.4 MG/DL
CHLORIDE SERPL-SCNC: 103 MMOL/L
CO2 SERPL-SCNC: 27 MMOL/L
CREAT SERPL-MCNC: 0.6 MG/DL
EGFR: 112 ML/MIN/1.73M2
EOSINOPHIL # BLD AUTO: 0.04 K/UL
EOSINOPHIL NFR BLD AUTO: 1.1 %
GLUCOSE SERPL-MCNC: 80 MG/DL
HCT VFR BLD CALC: 40.4 %
HGB BLD-MCNC: 12.8 G/DL
IMM GRANULOCYTES NFR BLD AUTO: 0.3 %
LYMPHOCYTES # BLD AUTO: 1.38 K/UL
LYMPHOCYTES NFR BLD AUTO: 39.4 %
MAN DIFF?: NORMAL
MCHC RBC-ENTMCNC: 31.7 G/DL
MCHC RBC-ENTMCNC: 32.1 PG
MCV RBC AUTO: 101.3 FL
MONOCYTES # BLD AUTO: 0.25 K/UL
MONOCYTES NFR BLD AUTO: 7.1 %
NEUTROPHILS # BLD AUTO: 1.79 K/UL
NEUTROPHILS NFR BLD AUTO: 51.2 %
PLATELET # BLD AUTO: 298 K/UL
POTASSIUM SERPL-SCNC: 4.5 MMOL/L
PROT SERPL-MCNC: 7 G/DL
RBC # BLD: 3.99 M/UL
RBC # FLD: 12.3 %
SODIUM SERPL-SCNC: 139 MMOL/L
WBC # FLD AUTO: 3.5 K/UL

## 2022-06-24 ENCOUNTER — APPOINTMENT (OUTPATIENT)
Dept: CARDIOLOGY | Facility: CLINIC | Age: 47
End: 2022-06-24

## 2022-06-29 ENCOUNTER — APPOINTMENT (OUTPATIENT)
Age: 47
End: 2022-06-29

## 2022-06-29 VITALS
HEART RATE: 65 BPM | RESPIRATION RATE: 14 BRPM | BODY MASS INDEX: 21.49 KG/M2 | OXYGEN SATURATION: 99 % | SYSTOLIC BLOOD PRESSURE: 112 MMHG | WEIGHT: 129 LBS | HEIGHT: 65 IN | DIASTOLIC BLOOD PRESSURE: 70 MMHG

## 2022-06-29 DIAGNOSIS — R06.02 SHORTNESS OF BREATH: ICD-10-CM

## 2022-06-29 DIAGNOSIS — R06.00 DYSPNEA, UNSPECIFIED: ICD-10-CM

## 2022-06-29 PROCEDURE — 71046 X-RAY EXAM CHEST 2 VIEWS: CPT

## 2022-06-29 PROCEDURE — 99203 OFFICE O/P NEW LOW 30 MIN: CPT | Mod: 25

## 2022-06-29 NOTE — PROCEDURE
[FreeTextEntry1] : CXR PA and Lateral \par The costophrenic and cardiophrenic angles are sharp\par The mimi parenchyma shows no infiltrates, consolidations, or nodules \par The Mediastinum is within normal limits\par No pleural effusions\par

## 2022-06-29 NOTE — HISTORY OF PRESENT ILLNESS
[Shortness of Breath] : Shortness of Breath [Exercise Intolerance] : exercise intolerance [Fatigue] : no fatigue [Lightheadedness] : no lightheadedness [Palpitations] : no palpitations [Orthopnea] : no orthopnea [Chest Pain] : no chest pain [Chest Tightness] : chest tightness

## 2022-07-02 ENCOUNTER — LABORATORY RESULT (OUTPATIENT)
Age: 47
End: 2022-07-02

## 2022-07-08 ENCOUNTER — APPOINTMENT (OUTPATIENT)
Dept: CARDIOLOGY | Facility: CLINIC | Age: 47
End: 2022-07-08

## 2022-07-09 ENCOUNTER — OUTPATIENT (OUTPATIENT)
Dept: OUTPATIENT SERVICES | Facility: HOSPITAL | Age: 47
LOS: 1 days | Discharge: HOME | End: 2022-07-09

## 2022-07-09 ENCOUNTER — RESULT REVIEW (OUTPATIENT)
Age: 47
End: 2022-07-09

## 2022-07-09 DIAGNOSIS — J84.9 INTERSTITIAL PULMONARY DISEASE, UNSPECIFIED: ICD-10-CM

## 2022-07-09 PROCEDURE — 71250 CT THORAX DX C-: CPT | Mod: 26

## 2022-08-17 ENCOUNTER — APPOINTMENT (OUTPATIENT)
Dept: RHEUMATOLOGY | Facility: CLINIC | Age: 47
End: 2022-08-17

## 2022-08-17 VITALS
TEMPERATURE: 98 F | HEIGHT: 65 IN | OXYGEN SATURATION: 99 % | SYSTOLIC BLOOD PRESSURE: 120 MMHG | BODY MASS INDEX: 21.49 KG/M2 | WEIGHT: 129 LBS | DIASTOLIC BLOOD PRESSURE: 78 MMHG | HEART RATE: 59 BPM

## 2022-08-17 DIAGNOSIS — G56.01 CARPAL TUNNEL SYNDROME, RIGHT UPPER LIMB: ICD-10-CM

## 2022-08-17 DIAGNOSIS — M54.16 RADICULOPATHY, LUMBAR REGION: ICD-10-CM

## 2022-08-17 DIAGNOSIS — M54.12 RADICULOPATHY, CERVICAL REGION: ICD-10-CM

## 2022-08-17 PROCEDURE — 99214 OFFICE O/P EST MOD 30 MIN: CPT

## 2022-08-17 RX ORDER — NITROGLYCERIN 20 MG/G
2 OINTMENT TOPICAL
Qty: 1 | Refills: 3 | Status: DISCONTINUED | COMMUNITY
Start: 2021-08-10 | End: 2022-08-17

## 2022-08-17 RX ORDER — METHOTREXATE 2.5 MG/1
2.5 TABLET ORAL
Qty: 24 | Refills: 2 | Status: DISCONTINUED | COMMUNITY
Start: 2022-03-21 | End: 2022-08-17

## 2022-08-17 RX ORDER — NABUMETONE 750 MG/1
750 TABLET, FILM COATED ORAL
Qty: 60 | Refills: 2 | Status: DISCONTINUED | COMMUNITY
Start: 2022-04-18 | End: 2022-08-17

## 2022-08-17 RX ORDER — DULOXETINE HYDROCHLORIDE 30 MG/1
30 CAPSULE, DELAYED RELEASE PELLETS ORAL
Qty: 60 | Refills: 2 | Status: DISCONTINUED | COMMUNITY
Start: 2022-08-17 | End: 2022-08-17

## 2022-08-17 RX ORDER — ALBUTEROL SULFATE 90 UG/1
108 (90 BASE) INHALANT RESPIRATORY (INHALATION)
Qty: 1 | Refills: 3 | Status: DISCONTINUED | COMMUNITY
Start: 2022-06-29 | End: 2022-08-17

## 2022-08-17 RX ORDER — FOLIC ACID 1 MG/1
1 TABLET ORAL
Qty: 30 | Refills: 5 | Status: DISCONTINUED | COMMUNITY
Start: 2022-03-21 | End: 2022-08-17

## 2022-08-24 ENCOUNTER — LABORATORY RESULT (OUTPATIENT)
Age: 47
End: 2022-08-24

## 2022-08-25 LAB
ALBUMIN SERPL ELPH-MCNC: 4.4 G/DL
ALP BLD-CCNC: 38 U/L
ALT SERPL-CCNC: 13 U/L
ANION GAP SERPL CALC-SCNC: 10 MMOL/L
APPEARANCE: ABNORMAL
AST SERPL-CCNC: 18 U/L
BILIRUB SERPL-MCNC: 0.6 MG/DL
BILIRUBIN URINE: NEGATIVE
BLOOD URINE: NEGATIVE
BUN SERPL-MCNC: 16 MG/DL
C3 SERPL-MCNC: 99 MG/DL
C4 SERPL-MCNC: 18 MG/DL
CALCIUM SERPL-MCNC: 9.4 MG/DL
CHLORIDE SERPL-SCNC: 103 MMOL/L
CO2 SERPL-SCNC: 25 MMOL/L
COLOR: YELLOW
CREAT SERPL-MCNC: 0.6 MG/DL
CREAT SPEC-SCNC: 190 MG/DL
CREAT/PROT UR: 0.1 RATIO
CRP SERPL-MCNC: <3 MG/L
EGFR: 112 ML/MIN/1.73M2
ERYTHROCYTE [SEDIMENTATION RATE] IN BLOOD BY WESTERGREN METHOD: 9 MM/HR
GLUCOSE QUALITATIVE U: NEGATIVE
GLUCOSE SERPL-MCNC: 92 MG/DL
KETONES URINE: NORMAL
LEUKOCYTE ESTERASE URINE: NEGATIVE
NITRITE URINE: NEGATIVE
PH URINE: 6
POTASSIUM SERPL-SCNC: 4.1 MMOL/L
PROT SERPL-MCNC: 6.9 G/DL
PROT UR-MCNC: 27 MG/DLG/24H
PROTEIN URINE: ABNORMAL
SODIUM SERPL-SCNC: 138 MMOL/L
SPECIFIC GRAVITY URINE: 1.03
UROBILINOGEN URINE: NORMAL

## 2022-08-29 LAB — DSDNA AB SER-ACNC: 15 IU/ML

## 2022-08-31 ENCOUNTER — APPOINTMENT (OUTPATIENT)
Age: 47
End: 2022-08-31

## 2022-09-15 NOTE — HISTORY OF PRESENT ILLNESS
[FreeTextEntry1] : 47 year old female here for raynauds and SLE \par \par 8/17/22:\par She has fatigue, rayanuds in her fingers and toes turning yellow and red with feeling cold. Her body feels cold. She has numbness in right thumb, 2nd and 3rd fingers. She has dyspnea on exertion following with pulmonary. She chest pain too plans to follow up with cardiology. Reports fatigue, dry eyes - will be seeing ophtho in [place], dry mouth uses mouth wash, hair loss. She has left shoulder pain can't lift her arm up or reach back - didn’t go to PT. Reports myalgias in arms and lower legs. Reports bilateral numbness/funny feeling in bottom of feet. Reports memory issues following with neurology found to have right carpal tunnel, cervical and lumbar radiculopathy - hasn't gone to PT, and is wearing splints during the day. Doesn't notice help with nabumetone. \par \par 4/18/22:\par She reports left shoulder pain for the past 2 weeks without trauma associated with weakness. It hurts when she lifts her arm and has weakness on her left upper arm. Raynauds is active in the warm weather. Still with ongoing hair loss. She has finger and wrist pains worse in the morning, AM stiffness, and swollen. Has appointment with pulmonary 5/17 Dr. Sanches. Reports dyspnea on exertion and burning sensation in her chest radiating to her back\par \par \par 3/21/22:\par She has hair loss, memory loss for the past 2 months. Distant memory is intact. She has right wrist pain and right forearm bone pain. Fingers and feet feel swollen. Raynaud's is frequent she has gloves and hand warmers associated with numbness with pins and needles. Feels sweats, fatigue, she gets out of breath going up stairs, difficulty taking a deep breath (since August 2021), and palpitations. She gets red rash over nose, photosensitivity, mouth sores, numbness in lips, pruritus, headache. She feels depressed.\par \par 12/8/22:\par She still has fingertips and toes discoloration red to blue to white. It is associated with pain. \par She still has finger swelling every day, worse in AM. Her feet are swollen as well. \par No rashes. \par SHe has had chest pain with breathing for the past 2 months. \par Today she reports that she has had oral sores on inside of lips infrequently. \par She has had hair loss from front. \par She has had no miscarriage or blood clot history. reported 3 pregnancies were normal. \par \par Rheum history \par For many years, she has had discoloration of fingertips and toes that worsened with cold. She has had 3 phase color changes and extreme pain and pins and needles. \par \par SHe feels that her fingers are tight. Her  has weakened. she denied swallowing issues. she has had both shoulder pain that radiates down. \par \par She denied any rashes, canker sores. she has had hair loss and chest pain. chest pain lasts for 2 seconds. she reports that when she wakes yp her hands are swollen.  She denied redness of eyes or painful eye movements.

## 2022-09-15 NOTE — ASSESSMENT
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 r/b/a discussed. still with frequent episodes of raynaud. \par Increase sildenafil 20 mg TID (space out nifedipine and sildenafil). r/b/a discussed. \par nitro paste for finger base use discontinued as contraindicated with sildenafil 2/2 hypotension\par advised core warming  techniques\par US leg negative for DVT\par \par \par SLE, +anticardiolipin 38 (12/10/21)\par Scleroderma, myositis, RA workup, neg\par leukopenia, anemia, ARSENIO positive 1:80 speckled , aCL IgM high at 30.5. oral ulcers and pleurisy, temporal alopecia\par Plaquenil 400 mg daily advised. r/b/a discussed.  ophthalmology yearly visit advised\par Stop MTX ineffective - hand pains possible multifactorial with raynauds and carpal tunnel. Start diclofenac 75 mg BID\par Cymbalta can help her myalgias and arthralgias I asked her to discussed this with Neurology in light of her taking wellbutrin\par -Check SLE monitoring labs today\par \par Carpal tunnel syndrome, right wrist\par -Confirmed on EMG. Following with neurology. Switch to diclofenac 75 mg BID. Send to PT\par \par Cervical and lumbar radiculopathy\par -Following with neurology. Send to PT\par \par Left shoulder pain\par -X-ray showed OA. Concern for rotator cuff tendinitis/tear. Needs trial of PT. Diclofenac 75 mg BID\par \par \par Asthma and dyspnea\par -c/w following up w pulmonologist \par \par \par Palpitations with dyspnea\par -Following with cardiology TTE and stress test performed\par \par Costochondritis\par -TTP chest wall. Start diclofenac 75 mg BID PRN\par \par Forgetfulness and paresthesias\par -memory difficulties ?fibromyalgia picture \par -paresthesias ? raynauds\par -Following with neurology \par \par \par vit D insufficiency\par c/w 1000 u daily\par Level was normal \par \par HM\par COVID vaccine advised \par \par fu 3 months\par \par 9/15/22: She is having abdominal cramps, diarrhea, and hives like skin reaction for the past 2 weeks. Advised possible side effect of Cymbalta decrease to 1 tab daily (from BID) and if no resolution of symptoms she can stop and monitor off medication. All questions answered and she understands and agrees to plan. \par \par

## 2022-09-15 NOTE — PHYSICAL EXAM
[General Appearance - Alert] : alert [General Appearance - In No Acute Distress] : in no acute distress [Sclera] : the sclera and conjunctiva were normal [PERRL With Normal Accommodation] : pupils were equal in size, round, and reactive to light [Extraocular Movements] : extraocular movements were intact [Outer Ear] : the ears and nose were normal in appearance [Oropharynx] : the oropharynx was normal [Neck Appearance] : the appearance of the neck was normal [Neck Cervical Mass (___cm)] : no neck mass was observed [Jugular Venous Distention Increased] : there was no jugular-venous distention [Thyroid Diffuse Enlargement] : the thyroid was not enlarged [Thyroid Nodule] : there were no palpable thyroid nodules [Auscultation Breath Sounds / Voice Sounds] : lungs were clear to auscultation bilaterally [Heart Rate And Rhythm] : heart rate was normal and rhythm regular [Heart Sounds] : normal S1 and S2 [Heart Sounds Gallop] : no gallops [Murmurs] : no murmurs [Heart Sounds Pericardial Friction Rub] : no pericardial rub [Bowel Sounds] : normal bowel sounds [Abdomen Soft] : soft [Abdomen Tenderness] : non-tender [Abdomen Mass (___ Cm)] : no abdominal mass palpated [Abnormal Walk] : normal gait [Nail Clubbing] : no clubbing  or cyanosis of the fingernails [Musculoskeletal - Swelling] : no joint swelling seen [Motor Tone] : muscle strength and tone were normal [Skin Color & Pigmentation] : normal skin color and pigmentation [Skin Turgor] : normal skin turgor [] : no rash [Deep Tendon Reflexes (DTR)] : deep tendon reflexes were 2+ and symmetric [Sensation] : the sensory exam was normal to light touch and pinprick [No Focal Deficits] : no focal deficits [Oriented To Time, Place, And Person] : oriented to person, place, and time [Impaired Insight] : insight and judgment were intact [Affect] : the affect was normal [FreeTextEntry1] : Limited abduction left shoulder. [place] and empty can test positive

## 2022-09-20 ENCOUNTER — APPOINTMENT (OUTPATIENT)
Dept: RHEUMATOLOGY | Facility: CLINIC | Age: 47
End: 2022-09-20

## 2022-09-20 VITALS
OXYGEN SATURATION: 98 % | HEIGHT: 65 IN | WEIGHT: 129 LBS | HEART RATE: 57 BPM | TEMPERATURE: 98 F | DIASTOLIC BLOOD PRESSURE: 82 MMHG | SYSTOLIC BLOOD PRESSURE: 120 MMHG | BODY MASS INDEX: 21.49 KG/M2

## 2022-09-20 PROCEDURE — 99213 OFFICE O/P EST LOW 20 MIN: CPT

## 2022-09-20 NOTE — HISTORY OF PRESENT ILLNESS
[FreeTextEntry1] : 47 year old female here for raynauds and SLE \par \par 9/20/22:\par Reports left shoulder pain referral given for her to see orthopedics.\par Reports joint pain in wrists, legs, shoulders\par She has forgetfulness and memory loss\par \par 8/17/22:\par She has fatigue, rayanuds in her fingers and toes turning yellow and red with feeling cold. Her body feels cold. She has numbness in right thumb, 2nd and 3rd fingers. She has dyspnea on exertion following with pulmonary. She chest pain too plans to follow up with cardiology. Reports fatigue, dry eyes - will be seeing ophtho in [place], dry mouth uses mouth wash, hair loss. She has left shoulder pain can't lift her arm up or reach back - didn’t go to PT. Reports myalgias in arms and lower legs. Reports bilateral numbness/funny feeling in bottom of feet. Reports memory issues following with neurology found to have right carpal tunnel, cervical and lumbar radiculopathy - hasn't gone to PT, and is wearing splints during the day. Doesn't notice help with nabumetone. \par \par 4/18/22:\par She reports left shoulder pain for the past 2 weeks without trauma associated with weakness. It hurts when she lifts her arm and has weakness on her left upper arm. Raynauds is active in the warm weather. Still with ongoing hair loss. She has finger and wrist pains worse in the morning, AM stiffness, and swollen. Has appointment with pulmonary 5/17 Dr. Sanches. Reports dyspnea on exertion and burning sensation in her chest radiating to her back\par \par \par 3/21/22:\par She has hair loss, memory loss for the past 2 months. Distant memory is intact. She has right wrist pain and right forearm bone pain. Fingers and feet feel swollen. Raynaud's is frequent she has gloves and hand warmers associated with numbness with pins and needles. Feels sweats, fatigue, she gets out of breath going up stairs, difficulty taking a deep breath (since August 2021), and palpitations. She gets red rash over nose, photosensitivity, mouth sores, numbness in lips, pruritus, headache. She feels depressed.\par \par 12/8/22:\par She still has fingertips and toes discoloration red to blue to white. It is associated with pain. \par She still has finger swelling every day, worse in AM. Her feet are swollen as well. \par No rashes. \par SHe has had chest pain with breathing for the past 2 months. \par Today she reports that she has had oral sores on inside of lips infrequently. \par She has had hair loss from front. \par She has had no miscarriage or blood clot history. reported 3 pregnancies were normal. \par \par Rheum history \par For many years, she has had discoloration of fingertips and toes that worsened with cold. She has had 3 phase color changes and extreme pain and pins and needles. \par \par SHe feels that her fingers are tight. Her  has weakened. she denied swallowing issues. she has had both shoulder pain that radiates down. \par \par She denied any rashes, canker sores. she has had hair loss and chest pain. chest pain lasts for 2 seconds. she reports that when she wakes yp her hands are swollen.  She denied redness of eyes or painful eye movements.

## 2022-09-20 NOTE — ASSESSMENT
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 r/b/a discussed. still with frequent episodes of raynaud. \par Continue sildenafil 20 mg TID (space out nifedipine and sildenafil). r/b/a discussed. \par nitro paste for finger base use discontinued as contraindicated with sildenafil 2/2 hypotension\par advised core warming  techniques\par US leg negative for DVT\par \par \par SLE, +anticardiolipin 38 (12/10/21)\par Scleroderma, myositis, RA workup, neg\par leukopenia, anemia, ARSENIO positive 1:80 speckled , aCL IgM high at 30.5. oral ulcers and pleurisy, temporal alopecia\par Stop MTX ineffective - hand pains possible multifactorial with raynauds and carpal tunnel. Start diclofenac 75 mg BID\par Side effect to Cymbalta abdominal cramps and poor appetite\par Plaquenil 400 mg daily advised. r/b/a discussed.  ophthalmology yearly visit advised\par \par \par Carpal tunnel syndrome, right wrist\par -Confirmed on EMG. Following with neurology. Diclofenac 75 mg BID. Send to PT\par \par Cervical and lumbar radiculopathy\par -Following with neurology. Send to PT\par \par Left shoulder pain\par -X-ray showed OA. Concern for rotator cuff tendinitis/tear. Needs trial of PT. Diclofenac 75 mg BID. Referral to orhopedics given by PCP\par \par \par Asthma and dyspnea\par -c/w following up w pulmonologist \par \par \par Palpitations with dyspnea\par -Following with cardiology TTE and stress test performed\par \par Costochondritis\par -TTP chest wall. Start diclofenac 75 mg BID PRN\par \par Forgetfulness and paresthesias\par -memory difficulties ?fibromyalgia picture \par -paresthesias ? raynauds\par -F/u with neurology \par \par \par vit D insufficiency\par c/w 1000 u daily\par Level was normal \par \par HM\par COVID vaccine advised \par \par fu 3 months\par \par

## 2022-10-26 ENCOUNTER — APPOINTMENT (OUTPATIENT)
Dept: NEUROLOGY | Facility: CLINIC | Age: 47
End: 2022-10-26

## 2023-01-10 ENCOUNTER — APPOINTMENT (OUTPATIENT)
Dept: RHEUMATOLOGY | Facility: CLINIC | Age: 48
End: 2023-01-10
Payer: COMMERCIAL

## 2023-01-10 PROCEDURE — 99214 OFFICE O/P EST MOD 30 MIN: CPT

## 2023-01-10 NOTE — ASSESSMENT
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 r/b/a discussed including side effects of hypotension\par \par \par SLE\par -+anticardiolipin 38 (12/10/21)\par -Scleroderma, myositis, RA workup negative\par -Leukopenia, anemia, ARSENIO positive 1:80 speckled , aCL IgM high at 30.5. oral ulcers and pleurisy, temporal alopecia\par -Stopped MTX as ineffective - hand pains possible multifactorial with raynauds and carpal tunnel\par -Off diclofenac\par -Side effect to Cymbalta abdominal cramps and poor appetite\par -Continue Plaquenil 400 mg daily advised. r/b/a discussed.  ophthalmology yearly visit advised she is not up to date on exam\par \par \par Carpal tunnel syndrome, right wrist\par -Confirmed on EMG. Following with neurology. Diclofenac 75 mg BID\par \par Cervical and lumbar radiculopathy\par -Following with neurology. \par \par Left shoulder pain\par -X-ray showed OA. Concern for rotator cuff tendinitis/tear\par -Following with orthopedic s/p steroid injection left shoulder\par \par \par Asthma and dyspnea\par -c/w following up with pulmonologist. CT chest from 7/2022 reviewed. Needs to perform PFTs; ordered today\par \par Palpitations with dyspnea\par -F/u with cardiology;TTE and stress test performed\par \par Costochondritis\par -TTP chest wall. Diclofenac 75 mg BID PRN\par \par Forgetfulness and paresthesias\par -memory difficulties \par -paresthesias ? raynauds\par -F/u with neurology \par \par \par vit D insufficiency\par -c/w 1000 u daily\par -Level was normal \par \par HM\par COVID vaccine advised \par \par fu 3 months\par \par

## 2023-01-10 NOTE — PHYSICAL EXAM
[General Appearance - Alert] : alert [General Appearance - In No Acute Distress] : in no acute distress [Sclera] : the sclera and conjunctiva were normal [PERRL With Normal Accommodation] : pupils were equal in size, round, and reactive to light [Extraocular Movements] : extraocular movements were intact [Outer Ear] : the ears and nose were normal in appearance [Oropharynx] : the oropharynx was normal [Neck Appearance] : the appearance of the neck was normal [Neck Cervical Mass (___cm)] : no neck mass was observed [Jugular Venous Distention Increased] : there was no jugular-venous distention [Thyroid Diffuse Enlargement] : the thyroid was not enlarged [Thyroid Nodule] : there were no palpable thyroid nodules [Auscultation Breath Sounds / Voice Sounds] : lungs were clear to auscultation bilaterally [Heart Rate And Rhythm] : heart rate was normal and rhythm regular [Heart Sounds] : normal S1 and S2 [Heart Sounds Gallop] : no gallops [Murmurs] : no murmurs [Heart Sounds Pericardial Friction Rub] : no pericardial rub [Bowel Sounds] : normal bowel sounds [Abdomen Soft] : soft [Abdomen Tenderness] : non-tender [Abdomen Mass (___ Cm)] : no abdominal mass palpated [Abnormal Walk] : normal gait [Nail Clubbing] : no clubbing  or cyanosis of the fingernails [Musculoskeletal - Swelling] : no joint swelling seen [Motor Tone] : muscle strength and tone were normal [Skin Color & Pigmentation] : normal skin color and pigmentation [Skin Turgor] : normal skin turgor [] : no rash [Deep Tendon Reflexes (DTR)] : deep tendon reflexes were 2+ and symmetric [Sensation] : the sensory exam was normal to light touch and pinprick [No Focal Deficits] : no focal deficits [Oriented To Time, Place, And Person] : oriented to person, place, and time [Impaired Insight] : insight and judgment were intact [Affect] : the affect was normal [FreeTextEntry1] : skin in hands are dry, no finger ulcers

## 2023-01-10 NOTE — HISTORY OF PRESENT ILLNESS
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 Still with forgetfulness\par +Losartan added for hypertension\par +Painful mouth ulcer x 2 on roof of mouth and buccal mucosa lasted for 1 week\par +Difficulty catching breath and has chest pain when this happens\par +S/p cortisone injection left shoulder, will be following up orthopedics\par +Joint pain in wrists and knees\par +Left lateral thigh pain feels like "I went to the gym"\par +hair loss, dry skin, dry eyes, dry mouth, rash around lips, arms that resolved\par \par \par 9/20/22:\par Reports left shoulder pain referral given for her to see orthopedics.\par Reports joint pain in wrists, legs, shoulders\par She has forgetfulness and memory loss\par \par 8/17/22:\par She has fatigue, rayanuds in her fingers and toes turning yellow and red with feeling cold. Her body feels cold. She has numbness in right thumb, 2nd and 3rd fingers. She has dyspnea on exertion following with pulmonary. She chest pain too plans to follow up with cardiology. Reports fatigue, dry eyes - will be seeing ophtho in [place], dry mouth uses mouth wash, hair loss. She has left shoulder pain can't lift her arm up or reach back - didn’t go to PT. Reports myalgias in arms and lower legs. Reports bilateral numbness/funny feeling in bottom of feet. Reports memory issues following with neurology found to have right carpal tunnel, cervical and lumbar radiculopathy - hasn't gone to PT, and is wearing splints during the day. Doesn't notice help with nabumetone. \par \par 4/18/22:\par She reports left shoulder pain for the past 2 weeks without trauma associated with weakness. It hurts when she lifts her arm and has weakness on her left upper arm. Raynauds is active in the warm weather. Still with ongoing hair loss. She has finger and wrist pains worse in the morning, AM stiffness, and swollen. Has appointment with pulmonary 5/17 Dr. Sanches. Reports dyspnea on exertion and burning sensation in her chest radiating to her back\par \par \par 3/21/22:\par She has hair loss, memory loss for the past 2 months. Distant memory is intact. She has right wrist pain and right forearm bone pain. Fingers and feet feel swollen. Raynaud's is frequent she has gloves and hand warmers associated with numbness with pins and needles. Feels sweats, fatigue, she gets out of breath going up stairs, difficulty taking a deep breath (since August 2021), and palpitations. She gets red rash over nose, photosensitivity, mouth sores, numbness in lips, pruritus, headache. She feels depressed.\par \par 12/8/22:\par She still has fingertips and toes discoloration red to blue to white. It is associated with pain. \par She still has finger swelling every day, worse in AM. Her feet are swollen as well. \par No rashes. \par SHe has had chest pain with breathing for the past 2 months. \par Today she reports that she has had oral sores on inside of lips infrequently. \par She has had hair loss from front. \par She has had no miscarriage or blood clot history. reported 3 pregnancies were normal. \par \par Rheum history \par For many years, she has had discoloration of fingertips and toes that worsened with cold. She has had 3 phase color changes and extreme pain and pins and needles. \par \par SHe feels that her fingers are tight. Her  has weakened. she denied swallowing issues. she has had both shoulder pain that radiates down. \par \par She denied any rashes, canker sores. she has had hair loss and chest pain. chest pain lasts for 2 seconds. she reports that when she wakes yp her hands are swollen.  She denied redness of eyes or painful eye movements.

## 2023-01-11 LAB
ALBUMIN SERPL ELPH-MCNC: 4.6 G/DL
ALP BLD-CCNC: 44 U/L
ALT SERPL-CCNC: 12 U/L
ANION GAP SERPL CALC-SCNC: 14 MMOL/L
APPEARANCE: CLEAR
AST SERPL-CCNC: 20 U/L
BASOPHILS # BLD AUTO: 0.05 K/UL
BASOPHILS NFR BLD AUTO: 0.8 %
BILIRUB SERPL-MCNC: 0.3 MG/DL
BILIRUBIN URINE: NEGATIVE
BLOOD URINE: NEGATIVE
BUN SERPL-MCNC: 15 MG/DL
CALCIUM SERPL-MCNC: 9.5 MG/DL
CHLORIDE SERPL-SCNC: 100 MMOL/L
CO2 SERPL-SCNC: 25 MMOL/L
COLOR: NORMAL
CREAT SERPL-MCNC: 0.6 MG/DL
CREAT SPEC-SCNC: 79 MG/DL
CREAT/PROT UR: 0.1 RATIO
CRP SERPL-MCNC: <3 MG/L
EGFR: 112 ML/MIN/1.73M2
EOSINOPHIL # BLD AUTO: 0.2 K/UL
EOSINOPHIL NFR BLD AUTO: 3.1 %
ERYTHROCYTE [SEDIMENTATION RATE] IN BLOOD BY WESTERGREN METHOD: 5 MM/HR
GLUCOSE QUALITATIVE U: NEGATIVE
GLUCOSE SERPL-MCNC: 78 MG/DL
HCT VFR BLD CALC: 38.2 %
HGB BLD-MCNC: 12.4 G/DL
IMM GRANULOCYTES NFR BLD AUTO: 0.3 %
KETONES URINE: NEGATIVE
LEUKOCYTE ESTERASE URINE: NEGATIVE
LYMPHOCYTES # BLD AUTO: 2.09 K/UL
LYMPHOCYTES NFR BLD AUTO: 32.4 %
MAN DIFF?: NORMAL
MCHC RBC-ENTMCNC: 32.5 G/DL
MCHC RBC-ENTMCNC: 33.7 PG
MCV RBC AUTO: 103.8 FL
MONOCYTES # BLD AUTO: 0.53 K/UL
MONOCYTES NFR BLD AUTO: 8.2 %
NEUTROPHILS # BLD AUTO: 3.57 K/UL
NEUTROPHILS NFR BLD AUTO: 55.2 %
NITRITE URINE: NEGATIVE
PH URINE: 7
PLATELET # BLD AUTO: 271 K/UL
POTASSIUM SERPL-SCNC: 4.7 MMOL/L
PROT SERPL-MCNC: 7.3 G/DL
PROT UR-MCNC: 6 MG/DLG/24H
PROTEIN URINE: NORMAL
RBC # BLD: 3.68 M/UL
RBC # FLD: 13.6 %
SODIUM SERPL-SCNC: 139 MMOL/L
SPECIFIC GRAVITY URINE: 1.02
UROBILINOGEN URINE: NORMAL
WBC # FLD AUTO: 6.46 K/UL

## 2023-01-12 LAB
C3 SERPL-MCNC: 105 MG/DL
C4 SERPL-MCNC: 20 MG/DL

## 2023-01-13 LAB — DSDNA AB SER-ACNC: <12 IU/ML

## 2023-02-13 ENCOUNTER — RX RENEWAL (OUTPATIENT)
Age: 48
End: 2023-02-13

## 2023-02-15 NOTE — HISTORY OF PRESENT ILLNESS
[FreeTextEntry1] : 46 y here for raynauds. \par \par Interval history\par She still has fingertips and toes discoloration red to blue to white. It is associated with pain. \par She still has finger swelling intermittently. \par No rashes. \par SHe has had pain with breathing for the past 2 months. \par Today she reports that she has had oral sores on inside of lips infrequently. \par She has had hair loss from front. \par She has had no miscarriage or blood clot history. reported 3 pregnancies were normal. \par \par Rheum history \par For many years, she has had discoloration of fingertips and toes that worsened with cold. She has had 3 phase color changes and extreme pain and pins and needles. \par \par SHe feels that her fingers are tight. Her  has weakened. she denied swallowing issues. she has had both shoulder pain that radiates down. \par \par She denied any rashes, canker sores. she has had hair loss and chest pain. chest pain lasts for 2 seconds. she reports that when she wakes yp her hands are swollen.  She denied redness of eyes or painful eye movements.  Oriented - self; Oriented - place; Oriented - time

## 2023-02-17 ENCOUNTER — APPOINTMENT (OUTPATIENT)
Dept: OBGYN | Facility: CLINIC | Age: 48
End: 2023-02-17
Payer: MEDICAID

## 2023-02-17 VITALS
DIASTOLIC BLOOD PRESSURE: 76 MMHG | SYSTOLIC BLOOD PRESSURE: 115 MMHG | WEIGHT: 133 LBS | BODY MASS INDEX: 22.16 KG/M2 | HEIGHT: 65 IN

## 2023-02-17 DIAGNOSIS — N83.201 UNSPECIFIED OVARIAN CYST, RIGHT SIDE: ICD-10-CM

## 2023-02-17 LAB
BILIRUB UR QL STRIP: NORMAL
GLUCOSE UR-MCNC: NORMAL
HCG UR QL: 0.2 EU/DL
HCG UR QL: NEGATIVE
HGB UR QL STRIP.AUTO: NORMAL
KETONES UR-MCNC: NORMAL
LEUKOCYTE ESTERASE UR QL STRIP: NORMAL
NITRITE UR QL STRIP: NORMAL
PH UR STRIP: 7
PROT UR STRIP-MCNC: NORMAL
SP GR UR STRIP: 1.01

## 2023-02-17 PROCEDURE — 99386 PREV VISIT NEW AGE 40-64: CPT

## 2023-02-17 PROCEDURE — 81025 URINE PREGNANCY TEST: CPT

## 2023-02-17 PROCEDURE — 99203 OFFICE O/P NEW LOW 30 MIN: CPT | Mod: 25

## 2023-02-17 PROCEDURE — 76830 TRANSVAGINAL US NON-OB: CPT

## 2023-02-17 PROCEDURE — 81003 URINALYSIS AUTO W/O SCOPE: CPT | Mod: QW

## 2023-02-18 ENCOUNTER — EMERGENCY (EMERGENCY)
Facility: HOSPITAL | Age: 48
LOS: 0 days | Discharge: HOME | End: 2023-02-18
Attending: EMERGENCY MEDICINE
Payer: MEDICAID

## 2023-02-18 VITALS
RESPIRATION RATE: 18 BRPM | TEMPERATURE: 98 F | SYSTOLIC BLOOD PRESSURE: 146 MMHG | OXYGEN SATURATION: 100 % | HEART RATE: 67 BPM | DIASTOLIC BLOOD PRESSURE: 70 MMHG

## 2023-02-18 VITALS — WEIGHT: 130.07 LBS | HEIGHT: 65 IN

## 2023-02-18 DIAGNOSIS — E03.9 HYPOTHYROIDISM, UNSPECIFIED: ICD-10-CM

## 2023-02-18 DIAGNOSIS — Z87.42 PERSONAL HISTORY OF OTHER DISEASES OF THE FEMALE GENITAL TRACT: ICD-10-CM

## 2023-02-18 DIAGNOSIS — N83.202 UNSPECIFIED OVARIAN CYST, LEFT SIDE: ICD-10-CM

## 2023-02-18 DIAGNOSIS — R10.2 PELVIC AND PERINEAL PAIN: ICD-10-CM

## 2023-02-18 DIAGNOSIS — Z97.5 PRESENCE OF (INTRAUTERINE) CONTRACEPTIVE DEVICE: ICD-10-CM

## 2023-02-18 DIAGNOSIS — Z20.822 CONTACT WITH AND (SUSPECTED) EXPOSURE TO COVID-19: ICD-10-CM

## 2023-02-18 DIAGNOSIS — Z86.79 PERSONAL HISTORY OF OTHER DISEASES OF THE CIRCULATORY SYSTEM: ICD-10-CM

## 2023-02-18 LAB
ANION GAP SERPL CALC-SCNC: 8 MMOL/L — SIGNIFICANT CHANGE UP (ref 7–14)
APTT BLD: 33.9 SEC — SIGNIFICANT CHANGE UP (ref 27–39.2)
BASOPHILS # BLD AUTO: 0.04 K/UL — SIGNIFICANT CHANGE UP (ref 0–0.2)
BASOPHILS NFR BLD AUTO: 0.8 % — SIGNIFICANT CHANGE UP (ref 0–1)
BUN SERPL-MCNC: 15 MG/DL — SIGNIFICANT CHANGE UP (ref 10–20)
CALCIUM SERPL-MCNC: 9.2 MG/DL — SIGNIFICANT CHANGE UP (ref 8.4–10.5)
CHLORIDE SERPL-SCNC: 105 MMOL/L — SIGNIFICANT CHANGE UP (ref 98–110)
CO2 SERPL-SCNC: 26 MMOL/L — SIGNIFICANT CHANGE UP (ref 17–32)
CREAT SERPL-MCNC: 0.6 MG/DL — LOW (ref 0.7–1.5)
EGFR: 111 ML/MIN/1.73M2 — SIGNIFICANT CHANGE UP
EOSINOPHIL # BLD AUTO: 0.1 K/UL — SIGNIFICANT CHANGE UP (ref 0–0.7)
EOSINOPHIL NFR BLD AUTO: 2.1 % — SIGNIFICANT CHANGE UP (ref 0–8)
GLUCOSE SERPL-MCNC: 85 MG/DL — SIGNIFICANT CHANGE UP (ref 70–99)
HCG SERPL-ACNC: <1 MIU/ML — SIGNIFICANT CHANGE UP
HCT VFR BLD CALC: 34.8 % — LOW (ref 37–47)
HGB BLD-MCNC: 12.1 G/DL — SIGNIFICANT CHANGE UP (ref 12–16)
IMM GRANULOCYTES NFR BLD AUTO: 0.2 % — SIGNIFICANT CHANGE UP (ref 0.1–0.3)
INR BLD: 1.2 RATIO — SIGNIFICANT CHANGE UP (ref 0.65–1.3)
LYMPHOCYTES # BLD AUTO: 2.06 K/UL — SIGNIFICANT CHANGE UP (ref 1.2–3.4)
LYMPHOCYTES # BLD AUTO: 43 % — SIGNIFICANT CHANGE UP (ref 20.5–51.1)
MCHC RBC-ENTMCNC: 33.2 PG — HIGH (ref 27–31)
MCHC RBC-ENTMCNC: 34.8 G/DL — SIGNIFICANT CHANGE UP (ref 32–37)
MCV RBC AUTO: 95.6 FL — SIGNIFICANT CHANGE UP (ref 81–99)
MONOCYTES # BLD AUTO: 0.41 K/UL — SIGNIFICANT CHANGE UP (ref 0.1–0.6)
MONOCYTES NFR BLD AUTO: 8.6 % — SIGNIFICANT CHANGE UP (ref 1.7–9.3)
NEUTROPHILS # BLD AUTO: 2.17 K/UL — SIGNIFICANT CHANGE UP (ref 1.4–6.5)
NEUTROPHILS NFR BLD AUTO: 45.3 % — SIGNIFICANT CHANGE UP (ref 42.2–75.2)
NRBC # BLD: 0 /100 WBCS — SIGNIFICANT CHANGE UP (ref 0–0)
PLATELET # BLD AUTO: 225 K/UL — SIGNIFICANT CHANGE UP (ref 130–400)
POTASSIUM SERPL-MCNC: 4.2 MMOL/L — SIGNIFICANT CHANGE UP (ref 3.5–5)
POTASSIUM SERPL-SCNC: 4.2 MMOL/L — SIGNIFICANT CHANGE UP (ref 3.5–5)
PROTHROM AB SERPL-ACNC: 13.8 SEC — HIGH (ref 9.95–12.87)
RBC # BLD: 3.64 M/UL — LOW (ref 4.2–5.4)
RBC # FLD: 12.4 % — SIGNIFICANT CHANGE UP (ref 11.5–14.5)
SARS-COV-2 RNA SPEC QL NAA+PROBE: SIGNIFICANT CHANGE UP
SODIUM SERPL-SCNC: 139 MMOL/L — SIGNIFICANT CHANGE UP (ref 135–146)
WBC # BLD: 4.79 K/UL — LOW (ref 4.8–10.8)
WBC # FLD AUTO: 4.79 K/UL — LOW (ref 4.8–10.8)

## 2023-02-18 PROCEDURE — 99285 EMERGENCY DEPT VISIT HI MDM: CPT

## 2023-02-18 PROCEDURE — 96374 THER/PROPH/DIAG INJ IV PUSH: CPT | Mod: XU

## 2023-02-18 PROCEDURE — 85025 COMPLETE CBC W/AUTO DIFF WBC: CPT

## 2023-02-18 PROCEDURE — 99282 EMERGENCY DEPT VISIT SF MDM: CPT

## 2023-02-18 PROCEDURE — 74177 CT ABD & PELVIS W/CONTRAST: CPT | Mod: 26,MA

## 2023-02-18 PROCEDURE — 99284 EMERGENCY DEPT VISIT MOD MDM: CPT | Mod: 25

## 2023-02-18 PROCEDURE — 84702 CHORIONIC GONADOTROPIN TEST: CPT

## 2023-02-18 PROCEDURE — 85610 PROTHROMBIN TIME: CPT

## 2023-02-18 PROCEDURE — 85730 THROMBOPLASTIN TIME PARTIAL: CPT

## 2023-02-18 PROCEDURE — 74177 CT ABD & PELVIS W/CONTRAST: CPT | Mod: MA

## 2023-02-18 PROCEDURE — 80048 BASIC METABOLIC PNL TOTAL CA: CPT

## 2023-02-18 PROCEDURE — 76830 TRANSVAGINAL US NON-OB: CPT

## 2023-02-18 PROCEDURE — 76830 TRANSVAGINAL US NON-OB: CPT | Mod: 26

## 2023-02-18 PROCEDURE — 87635 SARS-COV-2 COVID-19 AMP PRB: CPT

## 2023-02-18 PROCEDURE — 36415 COLL VENOUS BLD VENIPUNCTURE: CPT

## 2023-02-18 RX ORDER — SODIUM CHLORIDE 9 MG/ML
1000 INJECTION, SOLUTION INTRAVENOUS
Refills: 0 | Status: DISCONTINUED | OUTPATIENT
Start: 2023-02-18 | End: 2023-02-18

## 2023-02-18 RX ORDER — KETOROLAC TROMETHAMINE 30 MG/ML
15 SYRINGE (ML) INJECTION ONCE
Refills: 0 | Status: DISCONTINUED | OUTPATIENT
Start: 2023-02-18 | End: 2023-02-18

## 2023-02-18 RX ADMIN — SODIUM CHLORIDE 125 MILLILITER(S): 9 INJECTION, SOLUTION INTRAVENOUS at 02:35

## 2023-02-18 RX ADMIN — Medication 15 MILLIGRAM(S): at 03:01

## 2023-02-18 NOTE — ED PROVIDER NOTE - PHYSICAL EXAMINATION
Vital Signs: I have reviewed the initial vital signs.  Constitutional: well-nourished, appears stated age, no acute distress  HEENT: PERRL,  pink conjunctivae  Cardiovascular: regular rate, regular rhythm, well-perfused extremities  Respiratory: unlabored respiratory effort, clear to auscultation bilaterally  Gastrointestinal: soft, rt pelvic ttp.   Musculoskeletal: supple neck, no lower extremity edema  Integumentary: warm, dry, no rash  Neurologic: awake, alert, cranial nerves grossly intact, extremities’ motor and sensory functions grossly intact  Psychiatric: appropriate mood, appropriate affect

## 2023-02-18 NOTE — ED ADULT TRIAGE NOTE - CHIEF COMPLAINT QUOTE
I saw my OB/GYN today, he told me to come here for surgery to remove the cyst on my right ovary - patient

## 2023-02-18 NOTE — CONSULT NOTE ADULT - SUBJECTIVE AND OBJECTIVE BOX
PGY3 Note  Chief Complaint: abdominal and flank pain    HPI: 47yo P3 LMP 2w ago with Mirena IUD in place, presents with complaint of generalized lower abdominal pain and left sided flank pain. Patient reports some flank discomfort and lower abdominal pain since Thursday evening. She took some tylenol which improved the pain. Friday morning she reports some worsening discomfort described as "spasming pain on my left side starting in my back". Pain is accompanied by suprapubic pressure and painful urination. "It feels like something is stuck when I try to pee." She also endorses nausea since the AM. Denies fever, chills. Denies chest pain, SOB.    Patient has h/o ovarian cysts and h/o ovarian torsion so she presented to GYN office in the AM. In-office ultrasound showed a 4.5cm right ovarian cyst. Patient was recommended pain medication and to present to the ED if persistent symptoms for possible r/o torsion.    Ob/Gyn History:                   LMP - 2 weeks ago                  Cycle Length - irregular secondary to Mirena  H/o ovarian cysts - s/p lap cystectomy x2 (dermoid x1, torsion x1)  Denies history of uterine fibroids, abnormal paps, or STIs    OBhx:  FT  x3    Denies the following: constitutional symptoms, visual symptoms, cardiovascular symptoms, respiratory symptoms, GI symptoms, musculoskeletal symptoms, skin symptoms, neurologic symptoms, hematologic symptoms, allergic symptoms, psychiatric symptoms  Except any pertinent positives listed.     Home Medications:  synthroid 150mcg    Allergies: No Known Allergies    PAST MEDICAL & SURGICAL HISTORY:  Medical:   SLE  Raynaud's  HTN  Hypothyroidism    Surgical:  Lap right ovarian cystectomy x2  Lap appendectomy  Hemorrhoidectomy  Knee surgery    FAMILY HISTORY:  Diabetes  CAD    SOCIAL HISTORY: Denies cigarette use, alcohol use, or illicit drug use    Vital Signs Last 24 Hrs  T(F): 98.1 (2023 01:38), Max: 98.1 (2023 01:38)  HR: 67 (2023 01:38) (67 - 67)  BP: 146/70 (2023 01:38) (146/70 - 146/70)  RR: 18 (2023 01:38) (18 - 18)  Height (cm): 165.1 (23 @ 01:41)  Weight (kg): 59 (23 @ 01:41)  BMI (kg/m2): 21.6 (23 @ 01:41)  BSA (m2): 1.65 (23 @ 01:41)    General Appearance - AAOx3, NAD  Heart - S1S2 regular rate and rhythm  Lung - CTA Bilaterally  Abdomen - Soft, generalized discomfort, LLQ and suprapubic tenderness, nondistended, no rebound, no rigidity, no guarding    GYN/Pelvis:    Labia Majora - Normal  Labia Minora - Normal  Clitoris - Normal  Urethra - Normal  Vagina - Normal  Cervix - Normal    Uterus:  Size - Normal  Tenderness - None  Mass - None  Freely mobile    Adnexa:  Masses - None  Tenderness - None    LABS:                        12.1   4.79  )-----------( 225      ( 2023 02:30 )             34.8         139  |  105  |  15  ----------------------------<  85  4.2   |  26  |  0.6<L>    Ca    9.2      2023 02:30      PT/INR - ( 2023 02:30 )   PT: 13.80 sec;   INR: 1.20 ratio    PTT - ( 2023 02:30 )  PTT:33.9 sec    RADIOLOGY & ADDITIONAL STUDIES:  f/u TVUS  f/u CT Abd/Pelvis

## 2023-02-18 NOTE — ED PROVIDER NOTE - CARE PROVIDER_API CALL
Schuyler Gardner)  Obstetrics and Gynecology  [address] [place] [address]  Phone: [phone]  Fax: [phone]  Follow Up Time:

## 2023-02-18 NOTE — ED PROVIDER NOTE - OBJECTIVE STATEMENT
48-year-old female PMH hypothyroidism, Raynaud's phenomena, ovarian cysts presented for evaluation of abdominal pain.  Patient states that yesterday morning developed severe pelvic pain, was seen by Dr. Sharif who diagnosed with a large ovarian cyst and advised her to come to the hospital to be admitted tonight. Patient reports history of prior cystectomies.  Denies any fever chills, no vaginal bleeding, no dysuria .

## 2023-02-18 NOTE — ED PROVIDER NOTE - NSFOLLOWUPINSTRUCTIONS_ED_ALL_ED_FT
Ovarian Cyst    An ovarian cyst is a fluid-filled sac that forms on an ovary. The ovaries are small organs that produce eggs in women. Various types of cysts can form on the ovaries. Most are not cancerous. Many do not cause problems, and they often go away on their own. Some may cause symptoms and require treatment. Common types of ovarian cysts include:     Functional cysts—These cysts may occur every month during the menstrual cycle. This is normal. The cysts usually go away with the next menstrual cycle if the woman does not get pregnant. Usually, there are no symptoms with a functional cyst.  Endometrioma cysts—These cysts form from the tissue that lines the uterus. They are also called "chocolate cysts" because they become filled with blood that turns brown. This type of cyst can cause pain in the lower abdomen during intercourse and with your menstrual period.  Cystadenoma cysts—This type develops from the cells on the outside of the ovary. These cysts can get very big and cause lower abdomen pain and pain with intercourse. This type of cyst can twist on itself, cut off its blood supply, and cause severe pain. It can also easily rupture and cause a lot of pain.  Dermoid cysts—This type of cyst is sometimes found in both ovaries. These cysts may contain different kinds of body tissue, such as skin, teeth, hair, or cartilage. They usually do not cause symptoms unless they get very big.   Theca lutein cysts—These cysts occur when too much of a certain hormone (human chorionic gonadotropin) is produced and overstimulates the ovaries to produce an egg. This is most common after procedures used to assist with the conception of a baby (in vitro fertilization).    CAUSES  Fertility drugs can cause a condition in which multiple large cysts are formed on the ovaries. This is called ovarian hyperstimulation syndrome.   A condition called polycystic ovary syndrome can cause hormonal imbalances that can lead to nonfunctional ovarian cysts.     SIGNS AND SYMPTOMS  Many ovarian cysts do not cause symptoms. If symptoms are present, they may include:    Pelvic pain or pressure.  Pain in the lower abdomen.  Pain during sexual intercourse.  Increasing girth (swelling) of the abdomen.  Abnormal menstrual periods.  Increasing pain with menstrual periods.  Stopping having menstrual periods without being pregnant.    DIAGNOSIS  These cysts are commonly found during a routine or annual pelvic exam. Tests may be ordered to find out more about the cyst. These tests may include:    Ultrasound.  X-ray of the pelvis.  CT scan.  MRI.  Blood tests.    TREATMENT  Many ovarian cysts go away on their own without treatment. Your health care provider may want to check your cyst regularly for 2–3 months to see if it changes. For women in menopause, it is particularly important to monitor a cyst closely because of the higher rate of ovarian cancer in menopausal women. When treatment is needed, it may include any of the following:    A procedure to drain the cyst (aspiration). This may be done using a long needle and ultrasound. It can also be done through a laparoscopic procedure. This involves using a thin, lighted tube with a tiny camera on the end (laparoscope) inserted through a small incision.   Surgery to remove the whole cyst. This may be done using laparoscopic surgery or an open surgery involving a larger incision in the lower abdomen.   Hormone treatment or birth control pills. These methods are sometimes used to help dissolve a cyst.    HOME CARE INSTRUCTIONS  Only take over-the-counter or prescription medicines as directed by your health care provider.   Follow up with your health care provider as directed.   Get regular pelvic exams and Pap tests.    SEEK MEDICAL CARE IF:  Your periods are late, irregular, or painful, or they stop.  Your pelvic pain or abdominal pain does not go away.  Your abdomen becomes larger or swollen.  You have pressure on your bladder or trouble emptying your bladder completely.  You have pain during sexual intercourse.  You have feelings of fullness, pressure, or discomfort in your stomach.  You lose weight for no apparent reason.  You feel generally ill.  You become constipated.  You lose your appetite.  You develop acne.  You have an increase in body and facial hair.  You are gaining weight, without changing your exercise and eating habits.  You think you are pregnant.    SEEK IMMEDIATE MEDICAL CARE IF:  You have increasing abdominal pain.  You feel sick to your stomach (nauseous), and you throw up (vomit).  You develop a fever that comes on suddenly.  You have abdominal pain during a bowel movement.  Your menstrual periods become heavier than usual.

## 2023-02-18 NOTE — CONSULT NOTE ADULT - ATTENDING COMMENTS
Pt seen and examined in ER. Pt is a 47yo P3 with HTN and hypothyroidism, SLE and Raynauds , history of ovarian cysts and cystectomies, with left-sided abdominal and flank pain, started 2days ago, pain has persisted although she reports it has improved somewhat, she stated the worst pain was 2d ago. Did not take any meds since 2d ago. Seen in office and a simple ovarian cyst was noted. She has no n/v, no fever or chills. On my arrival patient was sleeping, after 1 dose Toradol. On exam she has moderate tenderness greatest in LLQ with palpation, no guarding or rebound, and the abdomen is non-distended. Labs are normal. Sono with 5 cm simple ovarian cyst, no torsion, some free fluid, CT scan confirms, no evidence of renal calculus. Hemodynamically stable.   Options reviewed with patient for this simple cyst. Conservative treatment with observation w/ analgesics vs surgery via laparoscopy for cystectomy possible oophorectomy. Risks/benefit and alternatives discussed with patient. She would prefer to defer surgery at this time. Precautions reviewed and will f/u in office.

## 2023-02-18 NOTE — ED PROVIDER NOTE - CLINICAL SUMMARY MEDICAL DECISION MAKING FREE TEXT BOX
48-year-old female presented for evaluation of pelvic pain secondary to an ovarian cyst.  Patient report improvement with treatment in ED, labs and imaging was submitted, all results were discussed with the patient.  Management was discussed with OB/GYN service, patient was seen at the bedside by them, cleared for discharge home with appropriate outpatient follow-up.  Strict return precautions given.

## 2023-02-18 NOTE — ED PROVIDER NOTE - NS ED ROS FT
Constitutional: (-) fever  Cardiovascular: (-) chest pain, (-) syncope  Respiratory: (-) cough, (-) shortness of breath  Gastrointestinal: (-) vomiting, (-) diarrhea, (+) lower abdominal pain  Musculoskeletal: (-) neck pain, (-) back pain, (-) joint pain  Integumentary: (-) rash, (-) edema  Neurological: (-) headache, (-) altered mental status  Allergic/Immunologic: (-) pruritus

## 2023-02-18 NOTE — CONSULT NOTE ADULT - ASSESSMENT
49yo P3 with HTN and hypothyroidism, with left-sided abdominal and flank pain, with ovarian cyst, r/o ovarian torsion vs kidney stone, currently clinically and hemodynamically stable.    -f/u TVUS and CT Abd/pelvis  -f/u UA  -GYN following    Dr. Gardner aware 49yo P3 with HTN and hypothyroidism, with left-sided abdominal and flank pain, with ovarian cyst, r/o ovarian torsion vs kidney stone, currently clinically and hemodynamically stable.    -f/u TVUS and CT Abd/pelvis  -f/u UA  -GYN following    Dr. Gardner aware    Addendum: Patient reports some improvement in pain after toradol. Ultrasound and CT Abd/Pelvis reviewed. No evidence of nephrolithiasis or ovarian torsion.    < from: CT Abdomen and Pelvis w/ IV Cont (02.18.23 @ 04:45) >  ACC: [id number] EXAM:  CT ABDOMEN AND PELVIS IC   ORDERED BY: LIT ELIZALDE     PROCEDURE DATE:  02/18/2023    ******PRELIMINARY REPORT******      ******PRELIMINARY REPORT******           INTERPRETATION:  CLINICAL STATEMENT: Abdominal pain. History of ovarian   cystectomies    TECHNIQUE: Contiguous axial CT images were obtained from the lower chest   to the pubic symphysis following administration of 100cc Omnipaque 350  intravenous contrast, 0 cc was discarded.  Oral contrast was not   administered.  Reformatted images in the coronal and sagittal planes were   acquired.    COMPARISON CT: None.    FINDINGS:    LOWER CHEST: Unremarkable.    HEPATOBILIARY: Right hepatic subcentimeter hypodensity is too small to   further characterize. No intrahepatic or extrahepatic biliary ductal   dilatation.    SPLEEN: Unremarkable.    PANCREAS: Unremarkable.    ADRENAL GLANDS: Unremarkable.    KIDNEYS: Left renal subcentimeter hypodensity is too small to further   characterize. Symmetric renal enhancement. No hydronephrosis bilaterally.   No renal calculi.    ABDOMINOPELVIC NODES: No enlarged lymph nodes.    PELVIC ORGANS: Intrauterine device is visualized. 4.3 x 5.9 cm left   adnexal cystic lesion.    PERITONEUM/MESENTERY/BOWEL: Trace pelvic ascites. No bowel obstruction or   intra-abdominal free air. Appendix is not visualized, however there is no   significant pericecal wall thickening to suggest inflammatory changes.    BONES/SOFT TISSUES: No acute osseous abnormality.    OTHER: Normal caliber abdominal aorta      IMPRESSION:    No renal calculi. No hydronephrosis bilaterally.    4.3 x 5.9 cm left adnexal cystic lesion.    Nonspecific trace pelvic ascites.        ******PRELIMINARY REPORT******      ******PRELIMINARY REPORT******     < end of copied text >    < from: US Transvaginal (02.18.23 @ 04:24) >  ACC: [id number] EXAM:  US TRANSVAGINAL   ORDERED BY: LIT ELIZALDE     PROCEDURE DATE:  02/18/2023          INTERPRETATION:  CLINICAL INFORMATION: Pain    LMP: February 4, 2023    COMPARISON: None available.    TECHNIQUE: Transvaginal pelvic sonogram      FINDINGS:  Uterus: 8.8 cm in length by 5.7 cm in width by 5.1 cm AP.. Subcentimeter   endocervical nabothian cysts.. Within normal limits.  Endometrium: IUD in place. .    Right ovary 2.5 cm x 1.7 cm x 1 cm. Within normal limits.  Left ovary isenlarged 5.4 cm x 6.2 cm x 4.7 cm containing a 5.5 cm   simple cyst    Bilateral ovarian vascular flow.    . Fluid: Minimal.    IMPRESSION:  1.  No ovarian torsion.    2.  Enlarged left ovary, 5.5 cm simple cyst.    3.  Pelvic free fluid    --- End of Report ---    < end of copied text >    Discussed options for expectant management of ovarian cyst vs surgical management. Risks, benefits and alternatives discussed. All questions answered. At this time patient desires to go home and follow-up outpatient. Recommended NSAIDs and acetaminophen for pain management prn. Torsion precautions given.     Dr. Gardner at bedside

## 2023-02-18 NOTE — ED PROVIDER NOTE - PATIENT PORTAL LINK FT
You can access the FollowMyHealth Patient Portal offered by [facility] by registering at the following website: [email or web address]. By joining [facility]’s FollowMyHealth portal, you will also be able to view your health information using other applications (apps) compatible with our system.

## 2023-02-18 NOTE — ED PROVIDER NOTE - PROGRESS NOTE DETAILS
Case discussed with OB/GYN, they requested pelvic sono without torsion, will see the patient in ED. Patient was evaluated by OB/GYN and cleared for discharge home, no ovarian torsion found on imaging.  She was advised to follow-up with Dr. Gardner, office will call her with a specific appointment. Patient to be discharged from ED. Any available test results were discussed with patient and/or family. Verbal instructions given, including instructions to return to ED immediately for any new, worsening, or concerning symptoms. Patient endorsed understanding. Written discharge instructions additionally given, including follow-up plan.  Patient was given opportunity to ask questions.

## 2023-02-27 ENCOUNTER — APPOINTMENT (OUTPATIENT)
Dept: OBGYN | Facility: CLINIC | Age: 48
End: 2023-02-27

## 2023-02-27 DIAGNOSIS — Z00.00 ENCOUNTER FOR GENERAL ADULT MEDICAL EXAMINATION W/OUT ABNORMAL FINDINGS: ICD-10-CM

## 2023-03-02 ENCOUNTER — APPOINTMENT (OUTPATIENT)
Dept: CARDIOLOGY | Facility: CLINIC | Age: 48
End: 2023-03-02

## 2023-03-22 NOTE — ED ADULT NURSE NOTE - CAS EDN DISCHARGE ASSESSMENT
----- Message from Tal Kay sent at 3/22/2023  9:24 AM EDT -----  Regarding: This wont go away :(  Hi I am not trying to be a spazz but I think I need another round of antibiotics I am not sure. I am still coughing up thick dark green mucus and sometimes it is thick and white. It is very gross and I gag when trying to cough it up. My back is extremely sore from all the coughing. I have trouble reading bc of coughing so much. I dont have a fever and no other symptoms. I have started taking the old mucus medicine I had from a year ago and the old prednisone hoping that would help. I dont know what else to do or if I should go to urgent care. I just want to feel better and stop coughing so much and coughing up this gross stuff. It hurts so much.
I gave her ceftin 10  days-on 3/7-lets change to zpak for different  bacterial coverage  Sent this in to robby  Make sure she is using the montelukast  If not improved in 7-10 days needs a visit thanks
Mychart response sent to pt
Alert and oriented to person, place and time/Patient baseline mental status/Awake

## 2023-04-03 PROBLEM — N83.201 CYST OF RIGHT OVARY: Status: ACTIVE | Noted: 2023-04-03

## 2023-04-03 NOTE — HISTORY OF PRESENT ILLNESS
[FreeTextEntry1] : pt comes in for evaluation of pelvic pain \par hx of ovarian cyst and laparoscopy for ovarian cyst\par pain started yesterday , took nothing for pain\par today a little better\par no avg bleeding\par  reg cycles no menomet \par no sob, no cp, full rom, no dysuria

## 2023-04-03 NOTE — PLAN
[FreeTextEntry1] : pelvic pain\par  right ovarian cuyst\par recommend full evaluation \par precautions given for ovarian torsion \par to [facility] ED

## 2023-04-03 NOTE — PHYSICAL EXAM
[Appropriately responsive] : appropriately responsive [Alert] : alert [No Acute Distress] : no acute distress [No Lymphadenopathy] : no lymphadenopathy [Regular Rate Rhythm] : regular rate rhythm [No Murmurs] : no murmurs [Clear to Auscultation B/L] : clear to auscultation bilaterally [Soft] : soft [Non-tender] : non-tender [Non-distended] : non-distended [No HSM] : No HSM [No Lesions] : no lesions [No Mass] : no mass [Oriented x3] : oriented x3 [Examination Of The Breasts] : a normal appearance [No Masses] : no breast masses were palpable [Labia Majora] : normal [Labia Minora] : normal [Normal] : normal [Uterine Adnexae] : normal [___] : [unfilled] ~Ucm mass on the right

## 2023-04-03 NOTE — COUNSELING
[Breast Self Exam] : breast self exam [Bladder Hygiene] : bladder hygiene [Lab Results] : lab results [FreeTextEntry2] : ovarian torision

## 2023-04-18 ENCOUNTER — APPOINTMENT (OUTPATIENT)
Dept: RHEUMATOLOGY | Facility: CLINIC | Age: 48
End: 2023-04-18
Payer: MEDICAID

## 2023-04-18 VITALS
WEIGHT: 129 LBS | SYSTOLIC BLOOD PRESSURE: 110 MMHG | OXYGEN SATURATION: 98 % | BODY MASS INDEX: 21.49 KG/M2 | DIASTOLIC BLOOD PRESSURE: 73 MMHG | HEIGHT: 65 IN | HEART RATE: 72 BPM

## 2023-04-18 PROCEDURE — 99214 OFFICE O/P EST MOD 30 MIN: CPT

## 2023-04-18 NOTE — PHYSICAL EXAM
[General Appearance - Alert] : alert [General Appearance - In No Acute Distress] : in no acute distress [Sclera] : the sclera and conjunctiva were normal [PERRL With Normal Accommodation] : pupils were equal in size, round, and reactive to light [Extraocular Movements] : extraocular movements were intact [Outer Ear] : the ears and nose were normal in appearance [Oropharynx] : the oropharynx was normal [Neck Appearance] : the appearance of the neck was normal [Neck Cervical Mass (___cm)] : no neck mass was observed [Jugular Venous Distention Increased] : there was no jugular-venous distention [Thyroid Diffuse Enlargement] : the thyroid was not enlarged [Thyroid Nodule] : there were no palpable thyroid nodules [Auscultation Breath Sounds / Voice Sounds] : lungs were clear to auscultation bilaterally [Heart Rate And Rhythm] : heart rate was normal and rhythm regular [Heart Sounds] : normal S1 and S2 [Murmurs] : no murmurs [Heart Sounds Gallop] : no gallops [Heart Sounds Pericardial Friction Rub] : no pericardial rub [Bowel Sounds] : normal bowel sounds [Abdomen Tenderness] : non-tender [Abdomen Soft] : soft [Abdomen Mass (___ Cm)] : no abdominal mass palpated [Abnormal Walk] : normal gait [Musculoskeletal - Swelling] : no joint swelling seen [Nail Clubbing] : no clubbing  or cyanosis of the fingernails [Motor Tone] : muscle strength and tone were normal [Skin Color & Pigmentation] : normal skin color and pigmentation [] : no rash [Skin Turgor] : normal skin turgor [Deep Tendon Reflexes (DTR)] : deep tendon reflexes were 2+ and symmetric [Sensation] : the sensory exam was normal to light touch and pinprick [No Focal Deficits] : no focal deficits [Oriented To Time, Place, And Person] : oriented to person, place, and time [Impaired Insight] : insight and judgment were intact [Affect] : the affect was normal [FreeTextEntry1] : skin in hands are dry, no finger ulcers

## 2023-04-18 NOTE — ASSESSMENT
[FreeTextEntry1] : 48 year old female here for raynauds and SLE\par \par Raynauds\par -Has painful oral ulcers\par -Possible secondary to autoimmune disease\par -has capillary dropout on nailfold\par -no skin thickening, esophageal symptoms, sclerodactyly, telangiectasias or manifestations of scleroderma\par -Still with active raynauds symptoms\par -nitro paste for finger base use discontinued as contraindicated with sildenafil 2/2 hypotension\par -advised core warming  techniques\par -Continue Nifedipine to 30 QID\par -Continue sildenafil to 40 mg TID (space out nifedipine and sildenafil). r/b/a discussed including side effects of hypotension\par \par \par SLE\par -+anticardiolipin 38 (12/10/21)\par -Scleroderma, myositis, RA workup negative\par -Leukopenia, anemia, ARSENIO positive 1:80 speckled , aCL IgM high at 30.5. oral ulcers and pleurisy, temporal alopecia\par -Stopped MTX as ineffective - hand pains possible multifactorial with raynauds and carpal tunnel\par -Off diclofenac\par -Side effect to Cymbalta abdominal cramps and poor appetite\par -Continue Plaquenil 400 mg daily advised. r/b/a discussed.  ophthalmology yearly visit advised she is not up to date on exam\par \par Memory loss\par -Patient is requesting for 2nd opinion\par \par

## 2023-04-18 NOTE — HISTORY OF PRESENT ILLNESS
[FreeTextEntry1] : 48 year old female here for raynauds and SLE \par \par 4/18/23:\par She went to dermatology for hair loss prescribed creams and advised injections if no improvement\par -Reports attacks of raynauds, numbness, pain, and dropping objects\par -Right sided mouth sores that are painful just resolved 3 ulcers\par -Joint pain in knees and wrists. Going up the stairs and bending down is painful. Knees are swollen in the morning. 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 Still with forgetfulness\par +Losartan added for hypertension\par +Painful mouth ulcer x 2 on roof of mouth and buccal mucosa lasted for 1 week\par +Difficulty catching breath and has chest pain when this happens\par +S/p cortisone injection left shoulder, will be following up orthopedics\par +Joint pain in wrists and knees\par +Left lateral thigh pain feels like "I went to the gym"\par +hair loss, dry skin, dry eyes, dry mouth, rash around lips, arms that resolved\par \par \par 9/20/22:\par Reports left shoulder pain referral given for her to see orthopedics.\par Reports joint pain in wrists, legs, shoulders\par She has forgetfulness and memory loss\par \par 8/17/22:\par She has fatigue, rayanuds in her fingers and toes turning yellow and red with feeling cold. Her body feels cold. She has numbness in right thumb, 2nd and 3rd fingers. She has dyspnea on exertion following with pulmonary. She chest pain too plans to follow up with cardiology. Reports fatigue, dry eyes - will be seeing ophtho in [place], dry mouth uses mouth wash, hair loss. She has left shoulder pain can't lift her arm up or reach back - didn’t go to PT. Reports myalgias in arms and lower legs. Reports bilateral numbness/funny feeling in bottom of feet. Reports memory issues following with neurology found to have right carpal tunnel, cervical and lumbar radiculopathy - hasn't gone to PT, and is wearing splints during the day. Doesn't notice help with nabumetone. \par \par 4/18/22:\par She reports left shoulder pain for the past 2 weeks without trauma associated with weakness. It hurts when she lifts her arm and has weakness on her left upper arm. Raynauds is active in the warm weather. Still with ongoing hair loss. She has finger and wrist pains worse in the morning, AM stiffness, and swollen. Has appointment with pulmonary 5/17 Dr. Sanches. Reports dyspnea on exertion and burning sensation in her chest radiating to her back\par \par \par 3/21/22:\par She has hair loss, memory loss for the past 2 months. Distant memory is intact. She has right wrist pain and right forearm bone pain. Fingers and feet feel swollen. Raynaud's is frequent she has gloves and hand warmers associated with numbness with pins and needles. Feels sweats, fatigue, she gets out of breath going up stairs, difficulty taking a deep breath (since August 2021), and palpitations. She gets red rash over nose, photosensitivity, mouth sores, numbness in lips, pruritus, headache. She feels depressed.\par \par 12/8/22:\par She still has fingertips and toes discoloration red to blue to white. It is associated with pain. \par She still has finger swelling every day, worse in AM. Her feet are swollen as well. \par No rashes. \par SHe has had chest pain with breathing for the past 2 months. \par Today she reports that she has had oral sores on inside of lips infrequently. \par She has had hair loss from front. \par She has had no miscarriage or blood clot history. reported 3 pregnancies were normal. \par \par Rheum history \par For many years, she has had discoloration of fingertips and toes that worsened with cold. She has had 3 phase color changes and extreme pain and pins and needles. \par \par SHe feels that her fingers are tight. Her  has weakened. she denied swallowing issues. she has had both shoulder pain that radiates down. \par \par She denied any rashes, canker sores. she has had hair loss and chest pain. chest pain lasts for 2 seconds. she reports that when she wakes yp her hands are swollen.  She denied redness of eyes or painful eye movements.

## 2023-07-11 ENCOUNTER — APPOINTMENT (OUTPATIENT)
Dept: RHEUMATOLOGY | Facility: CLINIC | Age: 48
End: 2023-07-11
Payer: COMMERCIAL

## 2023-07-11 VITALS
OXYGEN SATURATION: 98 % | DIASTOLIC BLOOD PRESSURE: 70 MMHG | BODY MASS INDEX: 21.66 KG/M2 | HEIGHT: 65 IN | WEIGHT: 130 LBS | SYSTOLIC BLOOD PRESSURE: 112 MMHG | HEART RATE: 50 BPM

## 2023-07-11 DIAGNOSIS — F32.A DEPRESSION, UNSPECIFIED: ICD-10-CM

## 2023-07-11 PROCEDURE — 99214 OFFICE O/P EST MOD 30 MIN: CPT

## 2023-07-11 RX ORDER — HYDROXYCHLOROQUINE SULFATE 200 MG/1
200 TABLET, FILM COATED ORAL
Qty: 60 | Refills: 5 | Status: ACTIVE | COMMUNITY
Start: 2021-09-16 | End: 1900-01-01

## 2023-07-11 RX ORDER — PREGABALIN 75 MG/1
75 CAPSULE ORAL
Qty: 60 | Refills: 5 | Status: ACTIVE | COMMUNITY
Start: 2023-07-11 | End: 1900-01-01

## 2023-07-11 RX ORDER — SILDENAFIL 20 MG/1
20 TABLET ORAL
Qty: 180 | Refills: 5 | Status: ACTIVE | COMMUNITY
Start: 2021-10-07 | End: 1900-01-01

## 2023-07-11 RX ORDER — DICLOFENAC SODIUM 75 MG/1
75 TABLET, DELAYED RELEASE ORAL
Qty: 60 | Refills: 0 | Status: DISCONTINUED | COMMUNITY
Start: 2022-08-17 | End: 2023-07-11

## 2023-07-13 DIAGNOSIS — R79.0 ABNORMAL LVL OF BLOOD MINERAL: ICD-10-CM

## 2023-07-13 LAB
25(OH)D3 SERPL-MCNC: 45 NG/ML
ALBUMIN SERPL ELPH-MCNC: 4.5 G/DL
ALP BLD-CCNC: 40 U/L
ALT SERPL-CCNC: 12 U/L
ANION GAP SERPL CALC-SCNC: 10 MMOL/L
AST SERPL-CCNC: 19 U/L
BILIRUB SERPL-MCNC: 0.6 MG/DL
BUN SERPL-MCNC: 18 MG/DL
C3 SERPL-MCNC: 100 MG/DL
C4 SERPL-MCNC: 17 MG/DL
CALCIUM SERPL-MCNC: 9.6 MG/DL
CHLORIDE SERPL-SCNC: 103 MMOL/L
CK SERPL-CCNC: 66 U/L
CO2 SERPL-SCNC: 26 MMOL/L
CREAT SERPL-MCNC: 0.6 MG/DL
CRP SERPL-MCNC: <3 MG/L
DSDNA AB SER-ACNC: 13 IU/ML
EGFR: 111 ML/MIN/1.73M2
ENA RNP AB SER IA-ACNC: 0.2 AL
ENA SM AB SER IA-ACNC: <0.2 AL
ENA SS-A AB SER IA-ACNC: <0.2 AL
ENA SS-B AB SER IA-ACNC: <0.2 AL
ERYTHROCYTE [SEDIMENTATION RATE] IN BLOOD BY WESTERGREN METHOD: 10 MM/HR
FOLATE SERPL-MCNC: >20 NG/ML
GLUCOSE SERPL-MCNC: 84 MG/DL
IRON SATN MFR SERPL: 60 %
IRON SERPL-MCNC: 152 UG/DL
POTASSIUM SERPL-SCNC: 4.8 MMOL/L
PROT SERPL-MCNC: 7.4 G/DL
SODIUM SERPL-SCNC: 139 MMOL/L
TIBC SERPL-MCNC: 252 UG/DL
TSH SERPL-ACNC: 0.66 UIU/ML
UIBC SERPL-MCNC: 100 UG/DL
VIT B12 SERPL-MCNC: 453 PG/ML

## 2023-07-13 NOTE — ASSESSMENT
[FreeTextEntry1] : 48 year old female here for raynauds and SLE\par \par Raynauds\par -Has painful oral ulcers\par -Possible secondary to autoimmune disease\par -has capillary dropout on nailfold\par -no skin thickening, esophageal symptoms, sclerodactyly, telangiectasias or manifestations of scleroderma\par -Still with active raynauds symptoms\par -nitro paste for finger base use discontinued as contraindicated with sildenafil 2/2 hypotension\par -advised core warming  techniques\par -Continue Nifedipine to 30 QID\par -Continue sildenafil to 40 mg TID (space out nifedipine and sildenafil). r/b/a discussed including side effects of hypotension\par -Discussed fluoxetine for raynauds, she started seeing psychiatry and therapist and started on anti depressant. She will get back with me about which medication it is prior to prescribing fluoxetine\par \par \par SLE\par -+anticardiolipin 38 (12/10/21)\par -Scleroderma, myositis, RA workup negative\par -Leukopenia, anemia, ARSENIO positive 1:80 speckled , aCL IgM high at 30.5. oral ulcers and pleurisy, temporal alopecia\par -Stopped MTX as ineffective - hand pains possible multifactorial with raynauds and carpal tunnel\par -Off diclofenac\par -Side effect to Cymbalta abdominal cramps and poor appetite\par -Continue Plaquenil 400 mg daily advised. r/b/a discussed.  ophthalmology yearly visit advised she is not up to date on exam\par -Start Pregabalin 75 mg BID for pain\par \par Memory loss\par -Patient is requesting for 2nd opinion\par \par Depression\par -Refer to psychiatry she wants a 2nd opinion\par \par 7/13/23:\par I called patient inform of labs showing elevated iron and iron saturation, refer to hematology for evaluation\par \par

## 2023-07-13 NOTE — HISTORY OF PRESENT ILLNESS
[FreeTextEntry1] : 48 year old female here for raynauds and SLE \par \par 7/11/23:\par She was diagnosed with COVD in June s/p paxlovid\par +Dry mouth\par +Feels legs become swollen when sitting on the couch or sleeping. Fingers get swollen during sleep and during the day she doesn't wear rings anymore. Symptoms are on and off. Swelling in the morning, gets better after a few hours\par Hands become numb when holding steering wheel or holding something for a while\par Pain in shoulders, wrists, days she can't open cans due to wrist pain. Morning stiffness depends from 30 minutes  - 1 hour. + left thumbs pain. Muscles hurt in the upper arms and shoulders\par Feels weak overall, cannot feel legs at times\par After sitting for a while legs become numb. After getting up and walking behind her knees hurt \par Nails are breaking\par Raynauds is active , tingling sensation in her finger tips\par Under the sun face and cheeks feel like they're on fire\par \par 4/18/23:\par She went to dermatology for hair loss prescribed creams and advised injections if no improvement\par -Reports attacks of raynauds, numbness, pain, and dropping objects\par -Right sided mouth sores that are painful just resolved 3 ulcers\par -Joint pain in knees and wrists. Going up the stairs and bending down is painful. Knees are swollen in the morning. 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 Still with forgetfulness\par +Losartan added for hypertension\par +Painful mouth ulcer x 2 on roof of mouth and buccal mucosa lasted for 1 week\par +Difficulty catching breath and has chest pain when this happens\par +S/p cortisone injection left shoulder, will be following up orthopedics\par +Joint pain in wrists and knees\par +Left lateral thigh pain feels like "I went to the gym"\par +hair loss, dry skin, dry eyes, dry mouth, rash around lips, arms that resolved\par \par \par 9/20/22:\par Reports left shoulder pain referral given for her to see orthopedics.\par Reports joint pain in wrists, legs, shoulders\par She has forgetfulness and memory loss\par \par 8/17/22:\par She has fatigue, rayanuds in her fingers and toes turning yellow and red with feeling cold. Her body feels cold. She has numbness in right thumb, 2nd and 3rd fingers. She has dyspnea on exertion following with pulmonary. She chest pain too plans to follow up with cardiology. Reports fatigue, dry eyes - will be seeing ophtho in [place], dry mouth uses mouth wash, hair loss. She has left shoulder pain can't lift her arm up or reach back - didn’t go to PT. Reports myalgias in arms and lower legs. Reports bilateral numbness/funny feeling in bottom of feet. Reports memory issues following with neurology found to have right carpal tunnel, cervical and lumbar radiculopathy - hasn't gone to PT, and is wearing splints during the day. Doesn't notice help with nabumetone. \par \par 4/18/22:\par She reports left shoulder pain for the past 2 weeks without trauma associated with weakness. It hurts when she lifts her arm and has weakness on her left upper arm. Raynauds is active in the warm weather. Still with ongoing hair loss. She has finger and wrist pains worse in the morning, AM stiffness, and swollen. Has appointment with pulmonary 5/17 Dr. Sanches. Reports dyspnea on exertion and burning sensation in her chest radiating to her back\par \par \par 3/21/22:\par She has hair loss, memory loss for the past 2 months. Distant memory is intact. She has right wrist pain and right forearm bone pain. Fingers and feet feel swollen. Raynaud's is frequent she has gloves and hand warmers associated with numbness with pins and needles. Feels sweats, fatigue, she gets out of breath going up stairs, difficulty taking a deep breath (since August 2021), and palpitations. She gets red rash over nose, photosensitivity, mouth sores, numbness in lips, pruritus, headache. She feels depressed.\par \par 12/8/22:\par She still has fingertips and toes discoloration red to blue to white. It is associated with pain. \par She still has finger swelling every day, worse in AM. Her feet are swollen as well. \par No rashes. \par SHe has had chest pain with breathing for the past 2 months. \par Today she reports that she has had oral sores on inside of lips infrequently. \par She has had hair loss from front. \par She has had no miscarriage or blood clot history. reported 3 pregnancies were normal. \par \par Rheum history \par For many years, she has had discoloration of fingertips and toes that worsened with cold. She has had 3 phase color changes and extreme pain and pins and needles. \par \par SHe feels that her fingers are tight. Her  has weakened. she denied swallowing issues. she has had both shoulder pain that radiates down. \par \par She denied any rashes, canker sores. she has had hair loss and chest pain. chest pain lasts for 2 seconds. she reports that when she wakes yp her hands are swollen.  She denied redness of eyes or painful eye movements.

## 2023-07-13 NOTE — PHYSICAL EXAM
[General Appearance - Alert] : alert [General Appearance - In No Acute Distress] : in no acute distress [Sclera] : the sclera and conjunctiva were normal [PERRL With Normal Accommodation] : pupils were equal in size, round, and reactive to light [Extraocular Movements] : extraocular movements were intact [Outer Ear] : the ears and nose were normal in appearance [Oropharynx] : the oropharynx was normal [Neck Appearance] : the appearance of the neck was normal [Neck Cervical Mass (___cm)] : no neck mass was observed [Thyroid Diffuse Enlargement] : the thyroid was not enlarged [Jugular Venous Distention Increased] : there was no jugular-venous distention [Thyroid Nodule] : there were no palpable thyroid nodules [Auscultation Breath Sounds / Voice Sounds] : lungs were clear to auscultation bilaterally [Heart Rate And Rhythm] : heart rate was normal and rhythm regular [Heart Sounds] : normal S1 and S2 [Heart Sounds Gallop] : no gallops [Murmurs] : no murmurs [Heart Sounds Pericardial Friction Rub] : no pericardial rub [Abdomen Soft] : soft [Bowel Sounds] : normal bowel sounds [Abdomen Tenderness] : non-tender [Abdomen Mass (___ Cm)] : no abdominal mass palpated [Abnormal Walk] : normal gait [Nail Clubbing] : no clubbing  or cyanosis of the fingernails [Musculoskeletal - Swelling] : no joint swelling seen [Motor Tone] : muscle strength and tone were normal [Skin Color & Pigmentation] : normal skin color and pigmentation [Skin Turgor] : normal skin turgor [] : no rash [Deep Tendon Reflexes (DTR)] : deep tendon reflexes were 2+ and symmetric [Sensation] : the sensory exam was normal to light touch and pinprick [No Focal Deficits] : no focal deficits [Oriented To Time, Place, And Person] : oriented to person, place, and time [Impaired Insight] : insight and judgment were intact [Affect] : the affect was normal [FreeTextEntry1] : skin in hands are dry, no finger ulcers

## 2023-07-14 LAB — ALDOLASE SERPL-CCNC: 2.9 U/L

## 2023-08-17 ENCOUNTER — EMERGENCY (EMERGENCY)
Facility: HOSPITAL | Age: 48
LOS: 0 days | Discharge: HOME | End: 2023-08-17
Attending: STUDENT IN AN ORGANIZED HEALTH CARE EDUCATION/TRAINING PROGRAM
Payer: COMMERCIAL

## 2023-08-17 VITALS
HEIGHT: 65 IN | RESPIRATION RATE: 18 BRPM | DIASTOLIC BLOOD PRESSURE: 70 MMHG | OXYGEN SATURATION: 100 % | TEMPERATURE: 97 F | HEART RATE: 55 BPM | WEIGHT: 126.99 LBS | SYSTOLIC BLOOD PRESSURE: 150 MMHG

## 2023-08-17 DIAGNOSIS — L23.7 ALLERGIC CONTACT DERMATITIS DUE TO PLANTS, EXCEPT FOOD: ICD-10-CM

## 2023-08-17 DIAGNOSIS — E03.9 HYPOTHYROIDISM, UNSPECIFIED: ICD-10-CM

## 2023-08-17 DIAGNOSIS — L29.9 PRURITUS, UNSPECIFIED: ICD-10-CM

## 2023-08-17 DIAGNOSIS — I73.00 RAYNAUD'S SYNDROME WITHOUT GANGRENE: ICD-10-CM

## 2023-08-17 DIAGNOSIS — R21 RASH AND OTHER NONSPECIFIC SKIN ERUPTION: ICD-10-CM

## 2023-08-17 DIAGNOSIS — M32.9 SYSTEMIC LUPUS ERYTHEMATOSUS, UNSPECIFIED: ICD-10-CM

## 2023-08-17 PROCEDURE — 99284 EMERGENCY DEPT VISIT MOD MDM: CPT

## 2023-08-17 PROCEDURE — 99283 EMERGENCY DEPT VISIT LOW MDM: CPT

## 2023-08-17 RX ORDER — DIPHENHYDRAMINE HCL 50 MG
50 CAPSULE ORAL ONCE
Refills: 0 | Status: COMPLETED | OUTPATIENT
Start: 2023-08-17 | End: 2023-08-17

## 2023-08-17 RX ADMIN — Medication 50 MILLIGRAM(S): at 19:55

## 2023-08-17 RX ADMIN — Medication 60 MILLIGRAM(S): at 19:55

## 2023-08-17 NOTE — ED PROVIDER NOTE - NSFOLLOWUPINSTRUCTIONS_ED_ALL_ED_FT
Please take medications as prescribed and follow up with Dermatology in 3-5 days.       Poison Ivy    WHAT YOU NEED TO KNOW:    Poison ivy is a plant that can cause an itchy, uncomfortable rash on your skin. Poison ivy grows as a shrub or vine in woods, fields, and areas of thick underbrush. It has 3 bright green leaves on each stem that turn red in lisa.     DISCHARGE INSTRUCTIONS:      Medicines:     Antiseptic or drying creams or ointments: These medicines may be used to dry out the rash and decrease the itching. These products may be available without a doctor's order.     Steroids: This medicine helps decrease itching and inflammation. It can be given as a cream to apply to your skin or as a pill.     Antihistamines: This medicine may help decrease itching and help you sleep. It is available without a doctor's order.     Take your medicine as directed. Contact your healthcare provider if you think your medicine is not helping or if you have side effects. Tell him of her if you are allergic to any medicine. Keep a list of the medicines, vitamins, and herbs you take. Include the amounts, and when and why you take them. Bring the list or the pill bottles to follow-up visits. Carry your medicine list with you in case of an emergency.    Follow up with your healthcare provider as directed: Write down your questions so you remember to ask them during your visits.     How your poison ivy rash spreads: You cannot spread poison ivy by touching your rash or the liquid from your blisters. Poison ivy is spread only if you scratch your skin while it still has oil on it. You may think your rash is spreading because new rashes appear over a number of days. This happens because areas covered by thin skin break out in a rash first. Your face or forearms may develop a rash before thicker areas, such as the palms of your hands.       Self-care:     Keep your rash clean and dry: Wash it with soap and water. Gently pat it dry with a clean towel.    Try not to scratch or rub your rash: This can cause your skin to become infected.    Use a compress on your rash: Dip a clean washcloth in cool water. Wring it out and place it on your rash. Leave the washcloth on your skin for 15 minutes. Do this at least 3 times per day.     Take a cornstarch or oatmeal bath: If your rash is too large to cover with wet washcloths, take 3 or 4 cornstarch baths daily. Mix 1 pound of cornstarch with a little water to make a paste. Add the paste to a tub full of water and mix well. You may also use colloidal oatmeal in the bath water. Use lukewarm water. Avoid hot water because it may cause your itching to increase.      Prevent a poison ivy rash in the future:     Wear skin protection: Wear long pants, a long-sleeved shirt, and gloves. Use a skin block lotion to protect your skin from poison ivy oil. You can find this at a drugstore without a prescription.    Wash clothing after possible exposure: If you think you have been near a poison ivy plant, wash the clothes you were wearing separately from other clothes. Rinse the washing machine well after you take the clothes out. Scrub boots and shoes with warm, soapy water. Dry clean items and clothing that you cannot wash in water. Poison ivy oil is sticky and can stay on surfaces for a long time. It can cause a new rash even years later.     Bathe your pet: Use warm water and shampoo on your pet's fur. This will prevent the spread of oil to your skin, car, and home. Wear long sleeves, long pants, and gloves while washing pets or any items that may have oil on them.    Reduce exposure to poison ivy: Do not touch plants that look like poison ivy. Keep your yard free of poison ivy. While protecting your skin, remove the plant and the roots. Place them in a plastic bag and seal the bag tightly.     Do not burn poison ivy plants: This can spread the oil through the air. If you breathe the oil into your lungs, you could have swelling and serious breathing problems. Oil that clings to the fire romi can land on your skin and cause a rash.      Contact your healthcare provider if:     You have pus, soft yellow scabs, or tenderness on the rash.  The itching gets worse or keeps you awake at night.  The rash covers more than 1/4 of your skin or spreads to your eyes, mouth, or genital area.   The rash is not better after 2 to 3 weeks.  You have tender, swollen glands on the sides of your neck.  You have swelling in your arms and legs.  You have questions or concerns about your condition or care.      Return to the emergency department if:     You have a fever.  You have redness, swelling, and tenderness around the rash.  You have trouble breathing.

## 2023-08-17 NOTE — ED PROVIDER NOTE - CLINICAL SUMMARY MEDICAL DECISION MAKING FREE TEXT BOX
48-year-old female with past medical history of hypothyroidism lupus and Raynaud phenomenon presents to the ED with right-sided facial rash that is now spreading to her neck- possible exposure to poison ivy. Exam with vesicular rash, no intraoral involvement. no eye involvement. no palms/soles rash. no fevers. no overlying cellulitis. will treat with course of oral steroids. follow up with dermatology.

## 2023-08-17 NOTE — ED PROVIDER NOTE - PATIENT PORTAL LINK FT
You can access the FollowMyHealth Patient Portal offered by [facility] by registering at the following website: [email or web address]. By joining [facility]’s FollowMyHealth portal, you will also be able to view your health information using other applications (apps) compatible with our system.

## 2023-08-17 NOTE — ED ADULT NURSE NOTE - NSFALLUNIVINTERV_ED_ALL_ED
Bed/Stretcher in lowest position, wheels locked, appropriate side rails in place/Call bell, personal items and telephone in reach/Instruct patient to call for assistance before getting out of bed/chair/stretcher/Non-slip footwear applied when patient is off stretcher/[place] to call system/Physically safe environment - no spills, clutter or unnecessary equipment/Purposeful proactive rounding/Room/bathroom lighting operational, light cord in reach

## 2023-08-17 NOTE — ED PROVIDER NOTE - CARE PROVIDER_API CALL
Phani Ivy  Dermatology  [address] [place] [id number]  Phone: [phone]  Fax: [phone]  Follow Up Time: 1-3 Days

## 2023-08-17 NOTE — ED PROVIDER NOTE - PHYSICAL EXAMINATION
VITAL SIGNS: I have reviewed nursing notes and confirm.  CONSTITUTIONAL: non-toxic, well appearing  SKIN: + R sided facial and neck erythematous raised scattered rash diffusely across the face, not in dermatomal pattern, no open sores, no petechiae   EYES: PERRL, EOMI, pink conjunctiva, anicteric  ENT: tongue midline, no exudates, MMM  NECK: Supple; no meningismus  CARD: RRR, no murmurs, equal radial pulses bilaterally 2+  RESP: CTAB, no respiratory distress  ABD: Soft, non-tender, non-distended  EXT: Normal ROM x4. No edema.   NEURO: Alert, oriented. no focal deficits   PSYCH: Cooperative, appropriate.

## 2023-08-17 NOTE — ED PROVIDER NOTE - OBJECTIVE STATEMENT
48-year-old female with past medical history of hypothyroidism lupus and Raynaud phenomenon presents to the ED with right-sided facial rash that is now spreading to her neck.  Rash initially began yesterday.  Associated with itching and burning sensation.  Patient was seen at an urgent care earlier, and was then able to be seen at the dermatologist [facility] by ROD Javier who believed pt had an allergic reaction and was started on hydroxyzine 10 mg and triamcinolone topical.  Patient states her symptoms have gotten worse.  Of note patient was doing yard work on August 13, and states several days later is when she noticed the rash.  Denies fevers chills ear pain vision changes eye pain chest pain shortness of breath throat pain drooling nausea vomiting diarrhea abdominal pain previous history of allergy use of new products lotions or creams.

## 2023-10-10 ENCOUNTER — APPOINTMENT (OUTPATIENT)
Dept: RHEUMATOLOGY | Facility: CLINIC | Age: 48
End: 2023-10-10
Payer: COMMERCIAL

## 2023-10-10 VITALS
HEART RATE: 67 BPM | SYSTOLIC BLOOD PRESSURE: 117 MMHG | HEIGHT: 65 IN | BODY MASS INDEX: 21.49 KG/M2 | WEIGHT: 129 LBS | DIASTOLIC BLOOD PRESSURE: 71 MMHG | OXYGEN SATURATION: 97 % | TEMPERATURE: 98.1 F

## 2023-10-10 DIAGNOSIS — M25.532 PAIN IN RIGHT WRIST: ICD-10-CM

## 2023-10-10 DIAGNOSIS — M25.562 PAIN IN RIGHT KNEE: ICD-10-CM

## 2023-10-10 DIAGNOSIS — M25.512 PAIN IN LEFT SHOULDER: ICD-10-CM

## 2023-10-10 DIAGNOSIS — M25.561 PAIN IN RIGHT KNEE: ICD-10-CM

## 2023-10-10 DIAGNOSIS — M25.531 PAIN IN RIGHT WRIST: ICD-10-CM

## 2023-10-10 PROCEDURE — 99215 OFFICE O/P EST HI 40 MIN: CPT

## 2023-10-10 RX ORDER — HYDROXYCHLOROQUINE SULFATE 200 MG/1
200 TABLET, FILM COATED ORAL
Qty: 180 | Refills: 3 | Status: ACTIVE | COMMUNITY
Start: 2023-10-10 | End: 1900-01-01

## 2023-10-10 RX ORDER — NIFEDIPINE 30 MG/1
30 TABLET, EXTENDED RELEASE ORAL
Qty: 360 | Refills: 3 | Status: ACTIVE | COMMUNITY
Start: 2023-10-10 | End: 1900-01-01

## 2023-10-10 RX ORDER — LEVOTHYROXINE SODIUM 0.15 MG/1
150 TABLET ORAL DAILY
Qty: 90 | Refills: 3 | Status: ACTIVE | COMMUNITY
Start: 2023-10-10 | End: 1900-01-01

## 2023-10-10 RX ORDER — PREGABALIN 75 MG/1
75 CAPSULE ORAL
Qty: 180 | Refills: 1 | Status: ACTIVE | COMMUNITY
Start: 2023-10-10 | End: 1900-01-01

## 2023-10-11 RX ORDER — SILDENAFIL 20 MG/1
20 TABLET ORAL 3 TIMES DAILY
Qty: 180 | Refills: 11 | Status: ACTIVE | COMMUNITY
Start: 2023-10-11 | End: 1900-01-01

## 2023-10-12 ENCOUNTER — NON-APPOINTMENT (OUTPATIENT)
Age: 48
End: 2023-10-12

## 2023-10-12 ENCOUNTER — APPOINTMENT (OUTPATIENT)
Dept: RHEUMATOLOGY | Facility: CLINIC | Age: 48
End: 2023-10-12

## 2023-10-12 RX ORDER — SILDENAFIL 20 MG/1
20 TABLET ORAL 3 TIMES DAILY
Qty: 540 | Refills: 3 | Status: DISCONTINUED | COMMUNITY
Start: 2023-10-10 | End: 2023-10-11

## 2023-10-16 RX ORDER — NIFEDIPINE 30 MG/1
30 TABLET, EXTENDED RELEASE ORAL DAILY
Qty: 120 | Refills: 5 | Status: DISCONTINUED | COMMUNITY
Start: 2021-08-10 | End: 2023-10-16

## 2023-10-19 RX ORDER — SILDENAFIL 20 MG/1
20 TABLET ORAL 3 TIMES DAILY
Qty: 540 | Refills: 3 | Status: ACTIVE | COMMUNITY
Start: 2023-10-19 | End: 1900-01-01

## 2023-11-22 ENCOUNTER — OUTPATIENT (OUTPATIENT)
Dept: OUTPATIENT SERVICES | Facility: HOSPITAL | Age: 48
LOS: 1 days | End: 2023-11-22
Payer: COMMERCIAL

## 2023-11-22 DIAGNOSIS — Z12.31 ENCOUNTER FOR SCREENING MAMMOGRAM FOR MALIGNANT NEOPLASM OF BREAST: ICD-10-CM

## 2023-11-22 PROCEDURE — 77063 BREAST TOMOSYNTHESIS BI: CPT | Mod: 26

## 2023-11-22 PROCEDURE — 77067 SCR MAMMO BI INCL CAD: CPT

## 2023-11-22 PROCEDURE — 77063 BREAST TOMOSYNTHESIS BI: CPT

## 2023-11-22 PROCEDURE — 77067 SCR MAMMO BI INCL CAD: CPT | Mod: 26

## 2023-11-23 DIAGNOSIS — Z12.31 ENCOUNTER FOR SCREENING MAMMOGRAM FOR MALIGNANT NEOPLASM OF BREAST: ICD-10-CM

## 2023-11-30 ENCOUNTER — APPOINTMENT (OUTPATIENT)
Dept: PLASTIC SURGERY | Facility: CLINIC | Age: 48
End: 2023-11-30

## 2024-04-26 ENCOUNTER — APPOINTMENT (OUTPATIENT)
Dept: NEUROLOGY | Facility: CLINIC | Age: 49
End: 2024-04-26
Payer: COMMERCIAL

## 2024-04-26 VITALS — HEART RATE: 56 BPM | SYSTOLIC BLOOD PRESSURE: 121 MMHG | DIASTOLIC BLOOD PRESSURE: 81 MMHG

## 2024-04-26 VITALS — HEIGHT: 65 IN | WEIGHT: 133 LBS | BODY MASS INDEX: 22.16 KG/M2

## 2024-04-26 DIAGNOSIS — I73.00 RAYNAUD'S SYNDROME W/OUT GANGRENE: ICD-10-CM

## 2024-04-26 DIAGNOSIS — R41.3 OTHER AMNESIA: ICD-10-CM

## 2024-04-26 DIAGNOSIS — G47.30 SLEEP APNEA, UNSPECIFIED: ICD-10-CM

## 2024-04-26 DIAGNOSIS — R52 PAIN, UNSPECIFIED: ICD-10-CM

## 2024-04-26 DIAGNOSIS — M32.9 SYSTEMIC LUPUS ERYTHEMATOSUS, UNSPECIFIED: ICD-10-CM

## 2024-04-26 PROCEDURE — 99205 OFFICE O/P NEW HI 60 MIN: CPT

## 2024-04-26 PROCEDURE — G2211 COMPLEX E/M VISIT ADD ON: CPT

## 2024-04-26 NOTE — HISTORY OF PRESENT ILLNESS
[FreeTextEntry1] : It's a pleasure to see Ms. ANNABELLA CRUZ In the office today. She is a 49 year -  old woman  who presents to the office today for the evaluation of cognitive impairment.  She reports that she has ongoing cognitive impairment for few years and had workup previously at [facility] which included MRI of the brain, EEG, computerized cognitive testing.  Results reviewed, and MRI show microvascular ischemia, EEG was normal, computerized cognitive testing showed cognitive impairment.  She was only recommended to do crossword puzzles.  She was given disability prison due to her cognitive dysfunction.  At 49, her cognitive impairment is almost certainly not secondary to dementia/neurodegenerative disease.  Secondary causes of her cognitive impairment currently include severe anxiety/depression which she rated at 8-9/10.  She was previously under the care of of a psychiatrist and was some medication but her insurance change therefore she is no longer on that medication.  She does see a therapist for cognitive behavioral therapy but she just resumed that recently and has not seeing any benefit yet.  Additionally she suffers from chronic pain secondary to lupus and she had to change her rheumatologist because her previous rheumatologist retired.  She has joint pain, muscle pain all the time which has not been treated well.  In the past she has seen pain management for cortisone injections which did not last.  Lastly when asked about sleep, she reports that she wakes up frequently at night and not being able to fall asleep and she average only around 4 to 5 hours a night.  She did a sleep study many years ago in [place] which was supposedly negative.  She does report snoring, occasional gasping but denies witnessed apneic episodes her [place] sleepiness score today is 10  She has shortness of breath on exertion, and is not able to walk 1 flight of stairs without getting shortness of breath.  She saw a pulmonologist once and was given sildenafil which is commonly used for pulmonary hypertension, but she is not sure whether that is the diagnosis that she was given.  She has not follow-up with her pulmonologist after the first visit.  Her physical limitation is also a source of anxiety/depression.

## 2024-04-26 NOTE — ASSESSMENT
[FreeTextEntry1] : Cognitive impairment-not likely to be dementia but secondary to severe anxiety/depression, chronic pain, physical limitation due to pulmonary issues, possible obstructive sleep apnea - Past workup reviewed including MRI of the brain, EEG, computerized cognitive testing and results discussed with patient - Will repeat her MRI of the brain without gadolinium - EEG - Neuropsych testing which will be more comprehensive than the computerized cognitive testing which would give us more insight into her condition as well as in more detail recommendation for cognitive therapy  Severe anxiety/depression - Psychiatry follow-up - Continue with escitalopram for now - Follow-up with psychologist  Chronic pain/lupus - Follow-up rheumatology - Consider seeing a pain management specialist  Sleep apnea - Overnight sleep study - Treating sleep apnea may help with her cognitive impairment, mood disorder, chronic pain   Total clinician time spent  is  61 minutes including preparing to see the patient, obtaining and/or reviewing and confirming history, performing a medically necessary and appropriate examination, counseling and educating the patient and/or family, documenting clinical information in the HER and communicating and/or referring to other healthcare professionals.

## 2024-05-06 ENCOUNTER — APPOINTMENT (OUTPATIENT)
Dept: MRI IMAGING | Facility: CLINIC | Age: 49
End: 2024-05-06
Payer: COMMERCIAL

## 2024-05-06 PROCEDURE — 70551 MRI BRAIN STEM W/O DYE: CPT

## 2024-07-11 ENCOUNTER — APPOINTMENT (OUTPATIENT)
Dept: RHEUMATOLOGY | Facility: CLINIC | Age: 49
End: 2024-07-11

## 2024-09-23 ENCOUNTER — RX RENEWAL (OUTPATIENT)
Age: 49
End: 2024-09-23

## 2024-10-14 ENCOUNTER — RX RENEWAL (OUTPATIENT)
Age: 49
End: 2024-10-14

## 2024-11-15 ENCOUNTER — APPOINTMENT (OUTPATIENT)
Dept: RHEUMATOLOGY | Facility: CLINIC | Age: 49
End: 2024-11-15
Payer: COMMERCIAL

## 2024-11-15 DIAGNOSIS — R20.2 ANESTHESIA OF SKIN: ICD-10-CM

## 2024-11-15 DIAGNOSIS — M32.9 SYSTEMIC LUPUS ERYTHEMATOSUS, UNSPECIFIED: ICD-10-CM

## 2024-11-15 DIAGNOSIS — R20.0 ANESTHESIA OF SKIN: ICD-10-CM

## 2024-11-15 DIAGNOSIS — I73.00 RAYNAUD'S SYNDROME W/OUT GANGRENE: ICD-10-CM

## 2024-11-15 PROCEDURE — 99214 OFFICE O/P EST MOD 30 MIN: CPT

## 2024-11-15 RX ORDER — SILDENAFIL 20 MG/1
20 TABLET ORAL TWICE DAILY
Qty: 360 | Refills: 1 | Status: ACTIVE | COMMUNITY
Start: 2024-11-15 | End: 1900-01-01

## 2025-01-09 ENCOUNTER — RX RENEWAL (OUTPATIENT)
Age: 50
End: 2025-01-09

## 2025-03-18 ENCOUNTER — RX RENEWAL (OUTPATIENT)
Age: 50
End: 2025-03-18

## 2025-04-09 ENCOUNTER — RX RENEWAL (OUTPATIENT)
Age: 50
End: 2025-04-09

## 2025-06-09 ENCOUNTER — RX RENEWAL (OUTPATIENT)
Age: 50
End: 2025-06-09

## 2025-08-28 ENCOUNTER — APPOINTMENT (OUTPATIENT)
Dept: RHEUMATOLOGY | Facility: CLINIC | Age: 50
End: 2025-08-28
Payer: COMMERCIAL

## 2025-08-28 VITALS
OXYGEN SATURATION: 98 % | WEIGHT: 130 LBS | DIASTOLIC BLOOD PRESSURE: 86 MMHG | BODY MASS INDEX: 21.66 KG/M2 | HEART RATE: 68 BPM | HEIGHT: 65 IN | SYSTOLIC BLOOD PRESSURE: 122 MMHG

## 2025-08-28 DIAGNOSIS — M32.9 SYSTEMIC LUPUS ERYTHEMATOSUS, UNSPECIFIED: ICD-10-CM

## 2025-08-28 DIAGNOSIS — R20.0 ANESTHESIA OF SKIN: ICD-10-CM

## 2025-08-28 DIAGNOSIS — R20.2 ANESTHESIA OF SKIN: ICD-10-CM

## 2025-08-28 PROCEDURE — 99215 OFFICE O/P EST HI 40 MIN: CPT

## 2025-08-28 RX ORDER — DULOXETINE 30 MG/1
30 CAPSULE, DELAYED RELEASE ORAL
Qty: 90 | Refills: 1 | Status: ACTIVE | COMMUNITY
Start: 2025-08-28 | End: 1900-01-01

## 2025-08-28 RX ORDER — SILDENAFIL 20 MG/1
20 TABLET ORAL
Qty: 90 | Refills: 1 | Status: ACTIVE | COMMUNITY
Start: 2025-08-28 | End: 1900-01-01

## 2025-08-28 RX ORDER — PREGABALIN 75 MG/1
75 CAPSULE ORAL DAILY
Qty: 90 | Refills: 1 | Status: ACTIVE | COMMUNITY
Start: 2025-08-28 | End: 1900-01-01

## 2025-09-08 ENCOUNTER — NON-APPOINTMENT (OUTPATIENT)
Age: 50
End: 2025-09-08